# Patient Record
Sex: FEMALE | Race: WHITE | NOT HISPANIC OR LATINO | Employment: OTHER | URBAN - METROPOLITAN AREA
[De-identification: names, ages, dates, MRNs, and addresses within clinical notes are randomized per-mention and may not be internally consistent; named-entity substitution may affect disease eponyms.]

---

## 2023-08-29 ENCOUNTER — TELEPHONE (OUTPATIENT)
Age: 73
End: 2023-08-29

## 2023-08-29 NOTE — TELEPHONE ENCOUNTER
Wanted to make a new patient apt but did not want to go on my chart for the wait list so I advised her to just give us a call back to check if we had any cancellations.

## 2025-02-06 ENCOUNTER — APPOINTMENT (OUTPATIENT)
Dept: RADIOLOGY | Facility: CLINIC | Age: 75
End: 2025-02-06
Payer: MEDICARE

## 2025-02-06 ENCOUNTER — OFFICE VISIT (OUTPATIENT)
Dept: OBGYN CLINIC | Facility: CLINIC | Age: 75
End: 2025-02-06
Payer: MEDICARE

## 2025-02-06 VITALS — BODY MASS INDEX: 23.7 KG/M2 | HEIGHT: 64 IN | WEIGHT: 138.8 LBS

## 2025-02-06 DIAGNOSIS — M25.562 CHRONIC PAIN OF LEFT KNEE: ICD-10-CM

## 2025-02-06 DIAGNOSIS — G89.29 CHRONIC PAIN OF LEFT KNEE: ICD-10-CM

## 2025-02-06 DIAGNOSIS — M25.562 LEFT KNEE PAIN, UNSPECIFIED CHRONICITY: ICD-10-CM

## 2025-02-06 DIAGNOSIS — M71.22 BAKER'S CYST OF KNEE, LEFT: ICD-10-CM

## 2025-02-06 DIAGNOSIS — M17.12 PRIMARY OSTEOARTHRITIS OF LEFT KNEE: Primary | ICD-10-CM

## 2025-02-06 PROCEDURE — 73562 X-RAY EXAM OF KNEE 3: CPT

## 2025-02-06 PROCEDURE — 73560 X-RAY EXAM OF KNEE 1 OR 2: CPT

## 2025-02-06 PROCEDURE — 99204 OFFICE O/P NEW MOD 45 MIN: CPT | Performed by: ORTHOPAEDIC SURGERY

## 2025-02-06 RX ORDER — CLOBETASOL PROPIONATE 0.5 MG/G
1 CREAM TOPICAL 2 TIMES DAILY
COMMUNITY
Start: 2024-11-06

## 2025-02-06 RX ORDER — AMMONIUM LACTATE 12 G/100G
LOTION TOPICAL
COMMUNITY
Start: 2024-11-19

## 2025-02-06 RX ORDER — MELOXICAM 7.5 MG/1
7.5 TABLET ORAL DAILY
Qty: 30 TABLET | Refills: 0 | Status: SHIPPED | OUTPATIENT
Start: 2025-02-06

## 2025-02-06 RX ORDER — LISINOPRIL AND HYDROCHLOROTHIAZIDE 10; 12.5 MG/1; MG/1
TABLET ORAL
COMMUNITY

## 2025-02-06 RX ORDER — OMEPRAZOLE 40 MG/1
40 CAPSULE, DELAYED RELEASE ORAL
COMMUNITY
Start: 2024-11-06

## 2025-02-06 RX ORDER — ATORVASTATIN CALCIUM 10 MG/1
10 TABLET, FILM COATED ORAL DAILY
COMMUNITY

## 2025-02-06 NOTE — PROGRESS NOTES
Assessment/Plan:  1. Primary osteoarthritis of left knee  meloxicam (MOBIC) 7.5 mg tablet    Ambulatory Referral to Physical Therapy      2. Chronic pain of left knee  XR knee 3 vw left non injury    XR knee 1 or 2 vw right    Ambulatory Referral to Physical Therapy      3. Baker's cyst of knee, left          Scribe Attestation      I,:  Torres Elizabethsamueldeirdre am acting as a scribe while in the presence of the attending physician.:       I,:  Wali De La Fuente, DO personally performed the services described in this documentation    as scribed in my presence.:           Dominga is a pleasant 75 y.o. female who presents for initial evaluation of left knee pain. Upon review of available imaging and clinical evaluation, we dicussed her pain and symptoms are consistent with left knee osteoarthritis. I recommended she begin outpatient physical therapy for lower body stretching and strengthening. We discussed beginning a prescription strength anti-inflammatory to address the baker's cyst. The patient was agreeable to the plan and a referral for PT was provided. A script for Meloxicam was provided with instructions. I recommended she utilize ice after aggravating activities and at end of day for pain management. I will see her back in 6 weeks for follow-up evaluation.      Subjective: Initial evaluation of left knee pain    Patient ID: Dominga Bender is a 75 y.o. female who presents in office for initial evaluation of left knee pain. She notes feeling a pop in the posterior knee 2 months ago while wearing heels at a wedding. She continues to experience pain in the medial, anterior, and posterior knee with weight bearing related activity rated a 5/10. Pain is aggravated by standing for extended periods of time, walking, and climbing stairs. She is currently managing pain with ice, heat, and OTC pain medication with mild relief. She denies mechanical symptoms including catching and locking. No further trauma or injury to the left knee. No  distal numbness or tingling.     Review of Systems   Constitutional:  Positive for activity change. Negative for chills and fever.   HENT:  Negative for ear pain and sore throat.    Eyes:  Negative for pain and visual disturbance.   Respiratory:  Negative for cough and shortness of breath.    Cardiovascular:  Negative for chest pain and palpitations.   Gastrointestinal:  Negative for abdominal pain and vomiting.   Genitourinary:  Negative for dysuria and hematuria.   Musculoskeletal:  Positive for arthralgias and myalgias. Negative for back pain.   Skin:  Negative for color change and rash.   Neurological:  Negative for seizures and syncope.   All other systems reviewed and are negative.        Past Medical History:   Diagnosis Date    Cancer (HCC)     Hypertension        Past Surgical History:   Procedure Laterality Date    BREAST LUMPECTOMY         Family History   Family history unknown: Yes       Social History     Occupational History    Not on file   Tobacco Use    Smoking status: Never    Smokeless tobacco: Never   Vaping Use    Vaping status: Never Used   Substance and Sexual Activity    Alcohol use: Yes    Drug use: Never    Sexual activity: Not on file         Current Outpatient Medications:     ammonium lactate (LAC-HYDRIN) 12 % lotion, APPLY TO DRY SKIN ON TRUNK AND ARMS, Disp: , Rfl:     atorvastatin (LIPITOR) 10 mg tablet, Take 10 mg by mouth daily, Disp: , Rfl:     Calcium 200 MG TABS, Take by mouth Three times a day, Disp: , Rfl:     Cholecalciferol 50 MCG (2000 UT) CAPS, Take by mouth, Disp: , Rfl:     clobetasol (TEMOVATE) 0.05 % cream, Apply 1 application. topically 2 (two) times a day, Disp: , Rfl:     lisinopril-hydrochlorothiazide (PRINZIDE,ZESTORETIC) 10-12.5 MG per tablet, TAKE 1 TABLET DAILY (LUPIN), Disp: , Rfl:     meloxicam (MOBIC) 7.5 mg tablet, Take 1 tablet (7.5 mg total) by mouth daily, Disp: 30 tablet, Rfl: 0    omeprazole (PriLOSEC) 40 MG capsule, Take 40 mg by mouth, Disp: , Rfl:      Allergies   Allergen Reactions    Penicillins Hives and Rash       Objective:  There were no vitals filed for this visit.    Body mass index is 24.2 kg/m².    Left Knee Exam     Muscle Strength   The patient has normal left knee strength.    Tenderness   The patient is experiencing tenderness in the patella and medial joint line (and popliteal fossa).    Range of Motion   Extension:  0   Flexion:  120     Tests   Reena:  Medial - negative Lateral - negative  Varus: negative Valgus: negative  Lachman:  Anterior - negative      Drawer:       Posterior - negative  Patellar apprehension: negative    Other   Erythema: absent  Scars: absent  Sensation: normal  Pulse: present  Swelling: none    Comments:  - Apley  Mild + patella grind  Mild swelling in the popliteal fossa  Knee stable at 0, 30, and 90  Peripatellar crepitus noted            Physical Exam  Nursing note reviewed.   Constitutional:       Appearance: Normal appearance.   HENT:      Head: Normocephalic.      Right Ear: External ear normal.      Left Ear: External ear normal.      Nose: Nose normal.   Eyes:      Extraocular Movements: Extraocular movements intact.      Conjunctiva/sclera: Conjunctivae normal.   Cardiovascular:      Rate and Rhythm: Normal rate.      Pulses: Normal pulses.   Pulmonary:      Breath sounds: Normal breath sounds.   Musculoskeletal:      Cervical back: Normal range of motion.      Left knee:      Instability Tests: Medial Reena test negative and lateral Reena test negative.      Comments: As noted   Skin:     General: Skin is warm and dry.   Neurological:      Mental Status: She is alert and oriented to person, place, and time.   Psychiatric:         Mood and Affect: Mood normal.         Behavior: Behavior normal.         I have personally reviewed pertinent films in PACS.  X-ray imaging obtained on 2/6/2025 demonstrate left knee moderate degenerative changes with osteophyte formation. No acute fracture or dislocation.  No lytic or blastic osseous lesions.     This document was created using speech voice recognition software.   Grammatical errors, random word insertions, pronoun errors, and incomplete sentences are an occasional consequence of this system due to software limitations, ambient noise, and hardware issues.   Any formal questions or concerns about content, text, or information contained within the body of this dictation should be directly addressed to the provider for clarification.

## 2025-02-26 ENCOUNTER — EVALUATION (OUTPATIENT)
Dept: PHYSICAL THERAPY | Facility: CLINIC | Age: 75
End: 2025-02-26
Payer: MEDICARE

## 2025-02-26 DIAGNOSIS — G89.29 CHRONIC PAIN OF LEFT KNEE: Primary | ICD-10-CM

## 2025-02-26 DIAGNOSIS — M54.50 RIGHT-SIDED LOW BACK PAIN WITHOUT SCIATICA, UNSPECIFIED CHRONICITY: ICD-10-CM

## 2025-02-26 DIAGNOSIS — M17.12 PRIMARY OSTEOARTHRITIS OF LEFT KNEE: ICD-10-CM

## 2025-02-26 DIAGNOSIS — M25.562 CHRONIC PAIN OF LEFT KNEE: Primary | ICD-10-CM

## 2025-02-26 PROCEDURE — 97110 THERAPEUTIC EXERCISES: CPT

## 2025-02-26 PROCEDURE — 97162 PT EVAL MOD COMPLEX 30 MIN: CPT

## 2025-02-26 NOTE — PROGRESS NOTES
PT Evaluation     Today's date: 2025  Patient name: Dominga Bender  : 1950  MRN: [id number]  Referring provider: Wali De La Fuente DO  Dx:   Encounter Diagnosis     ICD-10-CM    1. Chronic pain of left knee  M25.562 Ambulatory Referral to Physical Therapy    G89.29       2. Primary osteoarthritis of left knee  M17.12 Ambulatory Referral to Physical Therapy      3. Right-sided low back pain without sciatica, unspecified chronicity  M54.50           Start Time: 1430  Stop Time: 1525  Total time in clinic (min): 55 minutes    Assessment  Impairments: abnormal gait, abnormal or restricted ROM, activity intolerance, impaired physical strength, lacks appropriate home exercise program, pain with function and weight-bearing intolerance    Assessment details: Dominga Bender is a 75 y.o. female who presents with L knee and R sided low back pain, decreased B/L LE strength, decreased L knee ROM, ambulatory dysfunction with prolonged distances, and balance dysfunction (secondary to pain). Due to these impairments, patient has difficulty performing ADL's, recreational activities, engaging in social activities, ambulation of prolonged distances, stair negotiation at PLOF (performs with more caution and at slower pace), lifting/carrying. Mechanical assessment revealed signs of lumbar derangement with lumbar extension directional preference. Patient given CRESENCIO with HEP to further assess. Patient demo understanding of edu on centralization and peripheralization and when to stop vs continue based on results. Patient's clinical presentation is consistent with their referring diagnosis of Left knee pain, L knee osteoarthritis, and Right sided low back pain without sciatica. Patient has been educated in mechanical assessment, disc bulge vs stenosis and difference in treatment, peripheralization vs centralization, knee inflammation and effects on ROM, home exercise program and plan of care. Patient would benefit from skilled  physical therapy services to address their aforementioned functional limitations and progress towards prior level of function and independence with home exercise program.       Goals  Short Term Goals:  to be accomplished in 4 weeks   STG1. Initiate and advance HEP to maximize progress between therapy sessions  STG2. Pt will demo L/S AROM < or = mod loss throughout to promote improved functional mobility and body mechanics  STG3. Improve B/L LE strength to 4/5 to allow pt to raise from sit to stand w/o UE assist.  STG4. Reduce pain w/ WB activity to 0-4/10.  STG5. Pt will deny sleep disturbance secondary to pain     Long Term Goals:  Target Date 12 weeks   LTG1. Pt to be Indep with HEP  to maximize progress between therapy sessions and improve functional mobility  LTG2. Pt will demo L knee ROM of 0-130 needed for reciprocal stairs w/ handrail   LTG3.Pt to demo B/L LE strength of 4+/5 or better such that pt can perform reciprocal stair negotiation  LTG4. Pt will return to household ADLs and work duties pain free as per PLOF  LTG5. Pt will demo Good strength core stabilizers to promote carryover with body mech and posture  LTG6. Pt will demo L/S AROM < or = min loss throughout to promote improved functional mobility and body mechanics          Plan  Patient would benefit from: PT eval and skilled physical therapy  Planned modality interventions: cryotherapy, thermotherapy: hydrocollator packs and unattended electrical stimulation    Planned therapy interventions: manual therapy, neuromuscular re-education, self care, therapeutic activities, therapeutic exercise, home exercise program, patient education, balance/weight bearing training, IASTM, joint mobilization, Freitas taping, kinesiology taping, postural training, strengthening, stretching, flexibility and abdominal trunk stabilization    Frequency: 2x week  Plan of Care beginning date: 2/26/2025  Plan of Care expiration date: 5/21/2025  Treatment plan discussed  with: patient  Plan details: HEP development, stretching, strengthening, A/AA/PROM, joint mobilizations, posture education, STM/MI as needed to reduce muscle tension, muscle reeducation, PLOC discussed and agreed upon with patient.            Subjective Evaluation    History of Present Illness  Mechanism of injury: Patient reports to PT with concerns with L knee and R sided low back pain. Patient reports she has had PT in the past and chiropractor in the past for LBP. Patients knee pain began about 3 months ago after walking in heels for a while at a wedding. She had tried rest, ice, heat, and advil which helped a little. Patient then went to [place] to visit family where she noted she could not hike as well as previously. The knee pain is not constant but does flare up at night, stair negotiation (better than it was before), prolonged walking. Patient reports standing, ADLS like vacuuming, and walking tends to bother back pain.   Patient Goals  Patient goals for therapy: decreased pain, increased motion, increased strength, improved balance, independence with ADLs/IADLs and return to sport/leisure activities    Pain  Pain scale: L knee: 5/10  R LBP: 6/10.  Pain scale at highest: L knee: 7/10 and R LBP 9/10.  Quality: sharp and dull ache  Relieving factors: heat, medications and ice  Aggravating factors: stair climbing, walking and standing  Progression: no change    Social Support  Steps to enter house: yes  Stairs in house: yes   Lives in: multiple-level home  Lives with: spouse      Diagnostic Tests  Abnormal x-ray: L knee: Moderate medial compartment degenerative changes as above with mild varus angulation. No fracture. Small effusion.  Abnormal MRI: multiple disc bulges and stenosis.  Treatments  Previous treatment: physical therapy and chiropractic        Objective    (*=pain)    Lumbar ROM limitations   Flexion: min-mod bedoya*   Extension: mod bedoya*   RSG: min bedoya   LSG: min bedoya     Mechanical Assessment   5/10  R sided low back  CRESENCIO: 1x10 = dec/b        Knee AROM: deg      R  L  Extension: (sup)   0  -2  Flexion: (sup/prone)  130/110 125/100      Strength: MMT  Hip    R  L    IR(seated)   4/5  4-/5  ER(seated)   4/5  4-/5   Flexion(sup)   4/5  4/5  Abduction(S/L)  4/5  4/5  Extension (prone)  4-/5  4-/5    Knee    R  L    Extension(Seated)  4/5  4/5  Flexion(Seated)  4-/5  4-/5        Flexibility:  HS WNL  Min bedoya in B/L gastroc   Mod limin B/L quads/hip flexors       Patellar joint mobility:   mild hypomobility in L patella vs R     Tenderness/Palpation:  No TTP noted     Special Tests: L knee   Valgus Stress: neg  Varus Stress: neg   McMurrary's IR of the tibia + Varus stress = lateral meniscus: neg   McMurrays ER of the tibia + Valgus stress = medial meniscus: neg   Lachman: neg   Post drawer: neg     Balance:   Tandem R posterior: TBA  Tandem L posterior:TBA  SLS R:TBA  SLS L:TBA    Function:   Stairs:TBA  Squatting:TBA  Ambulation: mild antalgic gait pattern w/ limited stance time on LLE              Precautions:   Past Medical History:   Diagnosis Date    Cancer (HCC)     Hypertension      Past Surgical History:   Procedure Laterality Date    BREAST LUMPECTOMY       SOC: 2/26/2025  FOTO: 2/26/2025  POC Expiration: 5/21/2025  Daily Treatment Log:  Date 2/26/2025       Visit # 1       Auth         Auth exp        Manual                        There Exer        LAQ         Heel slides         Std hip abd         Std hip ext         Supine Leg press                                                 Pt edu  On mechanical assessment, lumbar derangement, peripheralization vs centralization, knee inflammation and effects on ROM,  and all questions answered        HEP Created and discussed       There Activ        STS         FSU         LSU                                 NMReed        Monster walk         Side stepping         HR/TR         SLR flex w/ QS         Bridges         Repeated knee ext sitting         TA brace  supine         CRESENCIO                                                 Modalities                                HEP:   Access Code: [email or web address]  URL: [email or web address]  Date: 02/26/2025  Prepared by: Siena Zabala    Exercises  - Standing Lumbar Extension with Counter  - 5 x daily - 7 x weekly - 1 sets - 10 reps  - Supine Heel Slide with Strap  - 1 x daily - 7 x weekly - 1 sets - 10 reps  - Seated Long Arc Quad  - 1 x daily - 7 x weekly - 1 sets - 10 reps  - Sidelying Hip Abduction  - 1 x daily - 7 x weekly - 1 sets - 10 reps

## 2025-03-03 ENCOUNTER — OFFICE VISIT (OUTPATIENT)
Dept: PHYSICAL THERAPY | Facility: CLINIC | Age: 75
End: 2025-03-03
Payer: MEDICARE

## 2025-03-03 DIAGNOSIS — M25.562 CHRONIC PAIN OF LEFT KNEE: Primary | ICD-10-CM

## 2025-03-03 DIAGNOSIS — M17.12 PRIMARY OSTEOARTHRITIS OF LEFT KNEE: ICD-10-CM

## 2025-03-03 DIAGNOSIS — M54.50 RIGHT-SIDED LOW BACK PAIN WITHOUT SCIATICA, UNSPECIFIED CHRONICITY: ICD-10-CM

## 2025-03-03 DIAGNOSIS — G89.29 CHRONIC PAIN OF LEFT KNEE: Primary | ICD-10-CM

## 2025-03-03 PROCEDURE — 97112 NEUROMUSCULAR REEDUCATION: CPT

## 2025-03-03 PROCEDURE — 97110 THERAPEUTIC EXERCISES: CPT

## 2025-03-03 NOTE — PROGRESS NOTES
"Daily Note     Today's date: 3/3/2025  Patient name: Dominga Bender  : 1950  MRN: [id number]  Referring provider: Wali De La Fuente DO  Dx:   Encounter Diagnosis     ICD-10-CM    1. Chronic pain of left knee  M25.562     G89.29       2. Primary osteoarthritis of left knee  M17.12       3. Right-sided low back pain without sciatica, unspecified chronicity  M54.50                      Subjective: Patient noted improvement with LBP with CRESENCIO. Patient has 3/10 in knee and back pain.       Objective: See treatment diary below      Assessment: Tolerated treatment well with some discomfort noted with bridges. No pain noted with other TE. Some difficulty with B/L leg press however able to perform. Patient would benefit from continued PT      Plan: Continue per plan of care.      Precautions:   Past Medical History:   Diagnosis Date    Cancer (HCC)     Hypertension      Past Surgical History:   Procedure Laterality Date    BREAST LUMPECTOMY       SOC: 2025  FOTO: 2025  POC Expiration: 2025  Daily Treatment Log:  Date 2025 3/3/2025      Visit # 1 2      Auth         Auth exp        Manual                        There Exer  15'      LAQ   3\" x10       Heel slides         Std hip abd   1x10 R/L alt       Std hip ext   1x10 R/L alt       Supine Leg press   B/L 10# 1x10       Figure 4 stretch   15\" x3 R/L       Supine hp flexor stretch   15\" x3 R/L                               Pt edu  On mechanical assessment, lumbar derangement, peripheralization vs centralization, knee inflammation and effects on ROM,  and all questions answered        HEP Created and discussed       There Activ        STS         FSU         LSU                                 NMReed  23'      Monster walk         Side stepping         HR/TR         SLR flex w/ QS   1x10 R/L       Bridges   1x10 (ache in low back )       Repeated knee ext sitting   1x10 = NE       TA brace supine   3\" x10 TC for engagement       CRESENCIO         Palloff " press   Dbl red tubing 1x10 ea way                                       Modalities                                HEP:   Access Code: [email or web address]  URL: [email or web address]  Date: 02/26/2025  Prepared by: Siena Zabala    Exercises  - Standing Lumbar Extension with Counter  - 5 x daily - 7 x weekly - 1 sets - 10 reps  - Supine Heel Slide with Strap  - 1 x daily - 7 x weekly - 1 sets - 10 reps  - Seated Long Arc Quad  - 1 x daily - 7 x weekly - 1 sets - 10 reps  - Sidelying Hip Abduction  - 1 x daily - 7 x weekly - 1 sets - 10 reps

## 2025-03-06 ENCOUNTER — OFFICE VISIT (OUTPATIENT)
Dept: PHYSICAL THERAPY | Facility: CLINIC | Age: 75
End: 2025-03-06
Payer: MEDICARE

## 2025-03-06 DIAGNOSIS — M17.12 PRIMARY OSTEOARTHRITIS OF LEFT KNEE: ICD-10-CM

## 2025-03-06 DIAGNOSIS — G89.29 CHRONIC PAIN OF LEFT KNEE: Primary | ICD-10-CM

## 2025-03-06 DIAGNOSIS — M54.50 RIGHT-SIDED LOW BACK PAIN WITHOUT SCIATICA, UNSPECIFIED CHRONICITY: ICD-10-CM

## 2025-03-06 DIAGNOSIS — M25.562 CHRONIC PAIN OF LEFT KNEE: Primary | ICD-10-CM

## 2025-03-06 PROCEDURE — 97110 THERAPEUTIC EXERCISES: CPT

## 2025-03-06 PROCEDURE — 97112 NEUROMUSCULAR REEDUCATION: CPT

## 2025-03-06 NOTE — PROGRESS NOTES
"Daily Note     Today's date: 3/6/2025  Patient name: Dominga Bender  : 1950  MRN: [id number]  Referring provider: Wali De La Fuente DO  Dx:   Encounter Diagnosis     ICD-10-CM    1. Chronic pain of left knee  M25.562     G89.29       2. Primary osteoarthritis of left knee  M17.12       3. Right-sided low back pain without sciatica, unspecified chronicity  M54.50                      Subjective: Patient reports about 4/10 R sided Low back. May have been due to vacuuming earlier today.  Patient has about 3/10 pain in knee. Patient had some mild soreness after last session but did calm down.       Objective: See treatment diary below  4/10 R sided low back pain   CRESENCIO: 2x10 = NE   Qped hip drop: 1x10 = dec/     Assessment: Tolerated treatment well with decrease in low back symptoms  with qped hip drops. Added to HEP with edu on centralization vs peripheralization. Noted discomfort in low back with supine hip flexor stretch, adjusted to standing with improved tolerance.  Patient would benefit from continued PT      Plan: Continue per plan of care.      Precautions:   Past Medical History:   Diagnosis Date    Cancer (HCC)     Hypertension      Past Surgical History:   Procedure Laterality Date    BREAST LUMPECTOMY       SOC: 2025  FOTO: 2025  POC Expiration: 2025  Daily Treatment Log:  Date 2025 3/3/2025 3/6/2025     Visit # 1 2 3     Auth         Auth exp        Manual                        There Exer  15' 25'     LAQ   3\" x10  3\" x10      Heel slides         Std hip abd   1x10 R/L alt  1x10 R/L alt      Std hip ext   1x10 R/L alt  1x10 R/L alt      Supine Leg press   B/L 10# 1x10       Figure 4 stretch   15\" x3 R/L  15\" x3 R/L      Supine hip flexor stretch   15\" x3 R/L  Std at counter 15\" x3 R/L alt                              Pt edu  On mechanical assessment, lumbar derangement, peripheralization vs centralization, knee inflammation and effects on ROM,  and all questions answered      " "  HEP Created and discussed  Updated and discussed      There Activ        STS         FSU         LSU                                 NMReed  23' 15'     Monster walk         Side stepping         HR/TR         SLR flex w/ QS   1x10 R/L       Bridges   1x10 (ache in low back )       Repeated knee ext sitting   1x10 = NE       TA brace supine   3\" x10 TC for engagement       CRESENCIO    1x10; x2      Palloff press   Dbl red tubing 1x10 ea way       Qped hip drop   1x10 w/ exhale                      Mechanical assessment    EG     Modalities                                HEP:   Access Code: [email or web address]  URL: [email or web address]  Date: 03/06/2025  Prepared by: Siena Zabala    Exercises  - Quadruped Hip Drops  - 2 x daily - 7 x weekly - 1 sets - 10 reps  - Standing Lumbar Extension with Counter  - 3 x daily - 7 x weekly - 1 sets - 10 reps  - Seated Long Arc Quad  - 1 x daily - 7 x weekly - 1 sets - 10 reps  - Standing Hip Abduction with Counter Support  - 1 x daily - 7 x weekly - 3 sets - 10 reps  - Standing Hip Extension with Counter Support  - 1 x daily - 7 x weekly - 3 sets - 10 reps  - Standing Hip Flexor Stretch  - 1 x daily - 7 x weekly - 1 sets - 5 reps - 10 sec hold         "

## 2025-03-10 ENCOUNTER — OFFICE VISIT (OUTPATIENT)
Dept: PHYSICAL THERAPY | Facility: CLINIC | Age: 75
End: 2025-03-10
Payer: MEDICARE

## 2025-03-10 DIAGNOSIS — M17.12 PRIMARY OSTEOARTHRITIS OF LEFT KNEE: ICD-10-CM

## 2025-03-10 DIAGNOSIS — M54.50 RIGHT-SIDED LOW BACK PAIN WITHOUT SCIATICA, UNSPECIFIED CHRONICITY: ICD-10-CM

## 2025-03-10 DIAGNOSIS — M25.562 CHRONIC PAIN OF LEFT KNEE: Primary | ICD-10-CM

## 2025-03-10 DIAGNOSIS — G89.29 CHRONIC PAIN OF LEFT KNEE: Primary | ICD-10-CM

## 2025-03-10 PROCEDURE — 97110 THERAPEUTIC EXERCISES: CPT

## 2025-03-10 PROCEDURE — 97112 NEUROMUSCULAR REEDUCATION: CPT

## 2025-03-10 NOTE — PROGRESS NOTES
"Daily Note     Today's date: 3/10/2025  Patient name: Dominga Bender  : 1950  MRN: [id number]  Referring provider: Wali De La Fuente DO  Dx:   Encounter Diagnosis     ICD-10-CM    1. Chronic pain of left knee  M25.562     G89.29       2. Primary osteoarthritis of left knee  M17.12       3. Right-sided low back pain without sciatica, unspecified chronicity  M54.50                      Subjective: Patient reports she was a bit sore after last session. Patient felt the the bed was too soft for correct form with qped hip drops and on the floor bothered her knees.       Objective: See treatment diary below      Assessment: Tolerated treatment well with no increased pain with trial of prone press up due to difficulty with qped hip drops at home secondary to knee pain.  Patient to report back on any soreness or discomfort post session at next visit. Patient would benefit from continued PT      Plan: Continue per plan of care.      Precautions:   Past Medical History:   Diagnosis Date    Cancer (HCC)     Hypertension      Past Surgical History:   Procedure Laterality Date    BREAST LUMPECTOMY       SOC: 2025  FOTO: 2025  POC Expiration: 2025  Daily Treatment Log:  Date 2025 3/3/2025 3/6/2025 3/10/2025    Visit # 1 2 3 4    Auth         Auth exp        Manual                        There Exer  15' 25' 23'    LAQ   3\" x10  3\" x10  W/ add 3\" x10    Heel slides         Std hip abd   1x10 R/L alt  1x10 R/L alt  1x10 R/L  Add YTB    Std hip ext   1x10 R/L alt  1x10 R/L alt  1x10 R/L      Supine Leg press   B/L 10# 1x10   B/L 10# 1x10     Figure 4 stretch   15\" x3 R/L  15\" x3 R/L  15\" x3 R/L     Supine hip flexor stretch   15\" x3 R/L  Std at counter 15\" x3 R/L alt  Std at counter 15\" x3 R/L alt                             Pt edu  On mechanical assessment, lumbar derangement, peripheralization vs centralization, knee inflammation and effects on ROM,  and all questions answered        HEP Created and " "discussed  Updated and discussed  Updated and discussed prone press up vs qped hip drop for knee pain    There Activ        STS         FSU         LSU                                 NMReed  23' 15' 15'    Monster walk         Side stepping         HR/TR     1x10 ea     SLR flex w/ QS   1x10 R/L   1x10 R/L     Bridges   1x10 (ache in low back )       Repeated knee ext sitting   1x10 = NE       TA brace supine   3\" x10 TC for engagement       CRESENCIO    1x10; x2      Palloff press   Dbl red tubing 1x10 ea way   Dbl red tubing 1x10 ea way     Qped hip drop   1x10 w/ exhale  Prone press up w/ exhale 1x10     Sciatic N glide     1x10 R/L             Mechanical assessment    EG     Modalities                                HEP:   Access Code: [email or web address]  URL: [email or web address]  Date: 03/10/2025  Prepared by: Siena Zabala    Program Notes  if you do extras of the prone press ups you can do less of the standing extension. The goal is a total of 5x a day minimum. Can do more if needed     Exercises  - Prone Press Up  - 2 x daily - 7 x weekly - 1 sets - 10 reps  - Standing Lumbar Extension with Counter  - 3 x daily - 7 x weekly - 1 sets - 10 reps  - Seated Long Arc Quad  - 1 x daily - 7 x weekly - 1 sets - 10 reps  - Standing Hip Abduction with Counter Support  - 1 x daily - 7 x weekly - 3 sets - 10 reps  - Standing Hip Extension with Counter Support  - 1 x daily - 7 x weekly - 3 sets - 10 reps  - Standing Hip Flexor Stretch  - 1 x daily - 7 x weekly - 1 sets - 5 reps - 10 sec hold         "

## 2025-03-11 DIAGNOSIS — M17.12 PRIMARY OSTEOARTHRITIS OF LEFT KNEE: ICD-10-CM

## 2025-03-11 NOTE — TELEPHONE ENCOUNTER
Reason for call:   [x] Refill   [] Prior Auth  [] Other:     Office:   [] PCP/Provider -   [x] Specialty/Provider - Ortho     Medication: Meloxicam     Dose/Frequency: 7.5 mg tablet taken by mouth once daily     Quantity: 30    Pharmacy: [id number] - [place] - [address] [facility] [phone]     Local Pharmacy   Does the patient have enough for 3 days?   [] Yes   [x] No - Send as HP to POD

## 2025-03-12 ENCOUNTER — OFFICE VISIT (OUTPATIENT)
Dept: PHYSICAL THERAPY | Facility: CLINIC | Age: 75
End: 2025-03-12
Payer: MEDICARE

## 2025-03-12 DIAGNOSIS — M25.562 CHRONIC PAIN OF LEFT KNEE: Primary | ICD-10-CM

## 2025-03-12 DIAGNOSIS — M54.50 RIGHT-SIDED LOW BACK PAIN WITHOUT SCIATICA, UNSPECIFIED CHRONICITY: ICD-10-CM

## 2025-03-12 DIAGNOSIS — G89.29 CHRONIC PAIN OF LEFT KNEE: Primary | ICD-10-CM

## 2025-03-12 DIAGNOSIS — M17.12 PRIMARY OSTEOARTHRITIS OF LEFT KNEE: ICD-10-CM

## 2025-03-12 PROCEDURE — 97110 THERAPEUTIC EXERCISES: CPT

## 2025-03-12 PROCEDURE — 97112 NEUROMUSCULAR REEDUCATION: CPT

## 2025-03-12 RX ORDER — MELOXICAM 7.5 MG/1
7.5 TABLET ORAL DAILY PRN
Qty: 30 TABLET | Refills: 1 | Status: SHIPPED | OUTPATIENT
Start: 2025-03-12

## 2025-03-12 NOTE — PROGRESS NOTES
"Daily Note     Today's date: 3/12/2025  Patient name: Dominga Bender  : 1950  MRN: [id number]  Referring provider: aWli De La Fuente DO  Dx:   Encounter Diagnosis     ICD-10-CM    1. Chronic pain of left knee  M25.562     G89.29       2. Primary osteoarthritis of left knee  M17.12       3. Right-sided low back pain without sciatica, unspecified chronicity  M54.50                        Subjective: Patient reports the prone press up still bothers her knee a bit, will trial with mat on floor to see if this improves tolerance. Patient finds the CRESENCIO feels better for the back and is not irritating the knee. Patients current pain level is 4/10 in the back. Patient feels the knee feels a bit better currently but it does still wake her up at night.       Objective: See treatment diary below      Assessment: Tolerated treatment well with no increase in pain reported t/o session with increased resistance with std hip abd and ext. Patient to report back on any soreness or discomfort post session at next visit. Will hold on press ups with HEP if continues to bother knee and increase frequency of CRESENCIO. Patient verbalized understanding. Patient would benefit from continued PT      Plan: Continue per plan of care.      Precautions:   Past Medical History:   Diagnosis Date    Cancer (HCC)     Hypertension      Past Surgical History:   Procedure Laterality Date    BREAST LUMPECTOMY       SOC: 2025  FOTO: 2025  POC Expiration: 2025  Daily Treatment Log:  Date 2025 3/3/2025 3/6/2025 3/10/2025 3/12/2025   Visit # 1 2 3 4 5 (FOTO)    Auth         Auth exp        Manual                        There Exer  15' 25' 23' 20'   LAQ   3\" x10  3\" x10  W/ add 3\" x10 W/ add 3\" x10 R/L    Heel slides         Std hip abd   1x10 R/L alt  1x10 R/L alt  1x10 R/L  YTB 1x10 R/L    Std hip ext   1x10 R/L alt  1x10 R/L alt  1x10 R/L   YTB 1x10 R/L    Supine Leg press   B/L 10# 1x10   B/L 10# 1x10  Seated B/L 10# 1x10 (less " "discomfort in shoulders)    Figure 4 stretch   15\" x3 R/L  15\" x3 R/L  15\" x3 R/L  15\" x3 R/L    Supine hip flexor stretch   15\" x3 R/L  Std at counter 15\" x3 R/L alt  Std at counter 15\" x3 R/L alt  Std at counter 15\" x3 R/L alt                            Pt edu  On mechanical assessment, lumbar derangement, peripheralization vs centralization, knee inflammation and effects on ROM,  and all questions answered     Instructed to do more of the CRESENCIO than prone press up if she feels it gives her a better response. May need to increase frequency or reps as needed for pain level.    HEP Created and discussed  Updated and discussed  Updated and discussed prone press up vs qped hip drop for knee pain    There Activ        STS         FSU         LSU                                 NMReed  23' 15' 15' 15'   Monster walk         Side stepping         HR/TR     1x10 ea  1x10 ea   SLR flex w/ QS   1x10 R/L   1x10 R/L  1x10 R/L    Bridges   1x10 (ache in low back )       Repeated knee ext sitting   1x10 = NE       TA brace supine   3\" x10 TC for engagement       CRESENCIO    1x10; x2   1x10    Palloff press   Dbl red tubing 1x10 ea way   Dbl red tubing 1x10 ea way  Sanjiv 5# 1x10 ea way    Qped hip drop   1x10 w/ exhale  Prone press up w/ exhale 1x10     Sciatic N glide     1x10 R/L  1x10 R/L           Mechanical assessment    EG     Modalities                                HEP:   Access Code: [email or web address]  URL: [email or web address]  Date: 03/10/2025  Prepared by: Siena Zabala    Program Notes  if you do extras of the prone press ups you can do less of the standing extension. The goal is a total of 5x a day minimum. Can do more if needed     Exercises  - Prone Press Up  - 2 x daily - 7 x weekly - 1 sets - 10 reps  - Standing Lumbar Extension with Counter  - 3 x daily - 7 x weekly - 1 sets - 10 reps  - Seated Long Arc Quad  - 1 x daily - 7 x weekly - 1 sets - 10 reps  - Standing Hip Abduction with Counter Support  - 1 x " daily - 7 x weekly - 3 sets - 10 reps  - Standing Hip Extension with Counter Support  - 1 x daily - 7 x weekly - 3 sets - 10 reps  - Standing Hip Flexor Stretch  - 1 x daily - 7 x weekly - 1 sets - 5 reps - 10 sec hold

## 2025-03-17 ENCOUNTER — APPOINTMENT (OUTPATIENT)
Dept: PHYSICAL THERAPY | Facility: CLINIC | Age: 75
End: 2025-03-17
Payer: MEDICARE

## 2025-03-19 ENCOUNTER — OFFICE VISIT (OUTPATIENT)
Dept: PHYSICAL THERAPY | Facility: CLINIC | Age: 75
End: 2025-03-19
Payer: MEDICARE

## 2025-03-19 DIAGNOSIS — G89.29 CHRONIC PAIN OF LEFT KNEE: Primary | ICD-10-CM

## 2025-03-19 DIAGNOSIS — M54.50 RIGHT-SIDED LOW BACK PAIN WITHOUT SCIATICA, UNSPECIFIED CHRONICITY: ICD-10-CM

## 2025-03-19 DIAGNOSIS — M17.12 PRIMARY OSTEOARTHRITIS OF LEFT KNEE: ICD-10-CM

## 2025-03-19 DIAGNOSIS — M25.562 CHRONIC PAIN OF LEFT KNEE: Primary | ICD-10-CM

## 2025-03-19 PROCEDURE — 97110 THERAPEUTIC EXERCISES: CPT

## 2025-03-19 PROCEDURE — 97112 NEUROMUSCULAR REEDUCATION: CPT

## 2025-03-19 NOTE — PROGRESS NOTES
"Daily Note     Today's date: 3/19/2025  Patient name: Doimnga Bender  : 1950  MRN: [id number]  Referring provider: Wali De La Fuente DO  Dx:   Encounter Diagnosis     ICD-10-CM    1. Chronic pain of left knee  M25.562     G89.29       2. Primary osteoarthritis of left knee  M17.12       3. Right-sided low back pain without sciatica, unspecified chronicity  M54.50                        Subjective: Patient reports she has about 4/10 pain. After last session she had about 1 day of T/S pain but that has gone away: thinks it may have been the leg press or palloff press as these were the only new exercises last session. CRESENCIO have been helping the low back pain.       Objective: See treatment diary below      Assessment: Tolerated treatment well. Held leg press and palloff press to assess response. HEP updated with patient verbalizing understanding. Patient has follow up with MD tomorrow. Patient would benefit from continued PT      Plan: Continue per plan of care.      Precautions:   Past Medical History:   Diagnosis Date    Cancer (HCC)     Hypertension      Past Surgical History:   Procedure Laterality Date    BREAST LUMPECTOMY       SOC: 2025  FOTO: 2025  POC Expiration: 2025  Daily Treatment Log:  Date 3/19/2025  3/6/2025 3/10/2025 3/12/2025   Visit # 6  3 4 5 (FOTO)    Auth         Auth exp        Manual                        There Exer 15'  25' 23' 20'   LAQ  W/ add 3\" x10 R/L alt   3\" x10  W/ add 3\" x10 W/ add 3\" x10 R/L    Std hip abd  YTB 1x10 R/L alt   1x10 R/L alt  1x10 R/L  YTB 1x10 R/L    Std hip ext  YTB 1x10 R/L alt   1x10 R/L alt  1x10 R/L   YTB 1x10 R/L    Supine Leg press  Hold    B/L 10# 1x10  Seated B/L 10# 1x10 (less discomfort in shoulders)    Figure 4 stretch  15\" x3 R/L   15\" x3 R/L  15\" x3 R/L  15\" x3 R/L    Std hip flexor stretch  Std at counter 15\" x3 R/L alt   Std at counter 15\" x3 R/L alt  Std at counter 15\" x3 R/L alt  Std at counter 15\" x3 R/L alt    Hooklying quanghell " " YTB 1x10                        Pt edu      Instructed to do more of the CRESENCIO than prone press up if she feels it gives her a better response. May need to increase frequency or reps as needed for pain level.    HEP Updated and discussed   Updated and discussed  Updated and discussed prone press up vs qped hip drop for knee pain    There Activ 5'       STS         FSU  Step tap 8\" step 1x10 R/L alt        LSU  4\" step 1x10 R/L                                NMReed 15'  15' 15' 15'   Monster walk         Side stepping         HR/TR  1x10 ea    1x10 ea  1x10 ea   SLR flex w/ QS  1x10 R/L    1x10 R/L  1x10 R/L    Bridges         TA brace supine  3\" x10        CRESENCIO  1x10   1x10; x2   1x10    Palloff press  Hold    Dbl red tubing 1x10 ea way  Engelhard 5# 1x10 ea way    Qped hip drop   1x10 w/ exhale  Prone press up w/ exhale 1x10     Sciatic N glide  1x10 R/L    1x10 R/L  1x10 R/L           Mechanical assessment    EG     Modalities                                HEP:   Access Code: [email or web address]  URL: [email or web address]  Date: 03/10/2025  Prepared by: Siena Zabala    Program Notes  if you do extras of the prone press ups you can do less of the standing extension. The goal is a total of 5x a day minimum. Can do more if needed     Exercises  - Prone Press Up  - 2 x daily - 7 x weekly - 1 sets - 10 reps  - Standing Lumbar Extension with Counter  - 3 x daily - 7 x weekly - 1 sets - 10 reps  - Seated Long Arc Quad  - 1 x daily - 7 x weekly - 1 sets - 10 reps  - Standing Hip Abduction with Counter Support  - 1 x daily - 7 x weekly - 3 sets - 10 reps  - Standing Hip Extension with Counter Support  - 1 x daily - 7 x weekly - 3 sets - 10 reps  - Standing Hip Flexor Stretch  - 1 x daily - 7 x weekly - 1 sets - 5 reps - 10 sec hold         "

## 2025-03-20 ENCOUNTER — OFFICE VISIT (OUTPATIENT)
Dept: OBGYN CLINIC | Facility: CLINIC | Age: 75
End: 2025-03-20
Payer: MEDICARE

## 2025-03-20 VITALS — WEIGHT: 141 LBS | HEIGHT: 64 IN | BODY MASS INDEX: 24.07 KG/M2

## 2025-03-20 DIAGNOSIS — M25.562 CHRONIC PAIN OF LEFT KNEE: ICD-10-CM

## 2025-03-20 DIAGNOSIS — G89.29 CHRONIC PAIN OF LEFT KNEE: ICD-10-CM

## 2025-03-20 DIAGNOSIS — M17.12 PRIMARY OSTEOARTHRITIS OF LEFT KNEE: Primary | ICD-10-CM

## 2025-03-20 PROCEDURE — 99213 OFFICE O/P EST LOW 20 MIN: CPT | Performed by: ORTHOPAEDIC SURGERY

## 2025-03-20 NOTE — ASSESSMENT & PLAN NOTE
Continue PT, transition to HEP when appropriate  Continue with Meloxicam as needed  Activities as tolerated

## 2025-03-20 NOTE — PROGRESS NOTES
"Name: Dominga Bender      : 1950      MRN: [id number]  Encounter Provider: Wali De La Fuente DO  Encounter Date: 3/20/2025   Encounter department: [facility]  :  Assessment & Plan  Primary osteoarthritis of left knee  Continue PT, transition to HEP when appropriate  Continue with Meloxicam as needed  Activities as tolerated       Chronic pain of left knee              Dominga Bender is a pleasant 75 y.o. female who presents for follow-up evaluation of left knee pain secondary to osteoarthritis.  Patient notes significant symptomatic improvement with physical therapy and meloxicam, I am pleased with her progress recommend continued conservative treatment at this time.  She can continue her efforts at physical therapy with transition to home exercise program when appropriate. Discussed reducing Meloxicam prescription to as needed, she was amenable to this. At this time she can follow up if symptoms worsen or fail to improve.       I have personally reviewed pertinent imaging in PACS.      History: Dominga Bender is a 75 y.o. female presents for follow-up evaluation of left knee pain secondary to osteoarthritis.  Patient was last seen 2025, she was referred to physical therapy and provided a prescription for meloxicam.  She is very pleased with her progress currently, her pain has reduced to a 2 out of 10 intermittently. When she has pain it is still localized to the medial joint line. She ambulates with a cane. Denies acute injury or trauma.       Estimated body mass index is 24.59 kg/m² as calculated from the following:    Height as of this encounter: 5' 3.5\" (1.613 m).    Weight as of this encounter: 64 kg (141 lb).    No results found for: \"HGBA1C\"    Social History     Occupational History    Not on file   Tobacco Use    Smoking status: Never    Smokeless tobacco: Never   Vaping Use    Vaping status: Never Used   Substance and Sexual Activity    Alcohol use: Yes    Drug " use: Never    Sexual activity: Not on file       Objective:  Left Knee Exam     Tenderness   The patient is experiencing no tenderness.     Range of Motion   Extension:  0   Left knee flexion: 125.     Tests   Varus: negative Valgus: negative    Other   Erythema: absent  Sensation: normal  Pulse: present  Swelling: none  Effusion: no effusion present    Comments:  Reduced bakers cyst  Mildly tender to palpation medial joint line  Skin is warm and dry to touch with no signs of erythema, ecchymosis, or infection   Flexor and extensor mechanisms are intact   Knee is stable to varus and valgus stress  Patella tracks centrally with crepitance  Calf compartments are soft and supple  2+ DP and PT pulses with brisk capillary refill to the toes  Sural, saphenous, tibial, superficial, and deep peroneal motor and sensory distributions intact  Sensation light touch intact distally            Observations   Left Knee   Negative for effusion.       There were no vitals filed for this visit.    Subjective:  Past Medical History:   Diagnosis Date    Cancer (HCC)     Hypertension        Past Surgical History:   Procedure Laterality Date    BREAST LUMPECTOMY         Family History   Family history unknown: Yes         Current Outpatient Medications:     ammonium lactate (LAC-HYDRIN) 12 % lotion, APPLY TO DRY SKIN ON TRUNK AND ARMS, Disp: , Rfl:     atorvastatin (LIPITOR) 10 mg tablet, Take 10 mg by mouth daily, Disp: , Rfl:     Calcium 200 MG TABS, Take by mouth Three times a day, Disp: , Rfl:     Cholecalciferol 50 MCG (2000 UT) CAPS, Take by mouth, Disp: , Rfl:     clobetasol (TEMOVATE) 0.05 % cream, Apply 1 application. topically 2 (two) times a day, Disp: , Rfl:     lisinopril-hydrochlorothiazide (PRINZIDE,ZESTORETIC) 10-12.5 MG per tablet, TAKE 1 TABLET DAILY (LUPIN), Disp: , Rfl:     meloxicam (MOBIC) 7.5 mg tablet, Take 1 tablet (7.5 mg total) by mouth daily as needed for mild pain or moderate pain, Disp: 30 tablet, Rfl: 1     omeprazole (PriLOSEC) 40 MG capsule, Take 40 mg by mouth, Disp: , Rfl:     Allergies   Allergen Reactions    Penicillins Hives and Rash       Review of Systems   Constitutional:  Positive for activity change. Negative for chills, fever and unexpected weight change.   HENT:  Negative for hearing loss, nosebleeds and sore throat.    Eyes:  Negative for pain, redness and visual disturbance.   Respiratory:  Negative for cough, shortness of breath and wheezing.    Cardiovascular:  Negative for chest pain, palpitations and leg swelling.   Gastrointestinal:  Negative for abdominal pain, nausea and vomiting.   Endocrine: Negative for polydipsia and polyuria.   Genitourinary:  Negative for dysuria and hematuria.   Musculoskeletal:  See HPI  Skin:  Negative for rash and wound.   Neurological:  Negative for dizziness, numbness and headaches.   Psychiatric/Behavioral:  Negative for decreased concentration and suicidal ideas. The patient is not nervous/anxious.      Physical Exam  Vitals and nursing note reviewed.   Constitutional:       Appearance: Normal appearance. She is well-developed.   HENT:      Head: Normocephalic and atraumatic.      Right Ear: External ear normal.      Left Ear: External ear normal.   Eyes:      General: No scleral icterus.     Extraocular Movements: Extraocular movements intact.      Conjunctiva/sclera: Conjunctivae normal.   Cardiovascular:      Rate and Rhythm: Normal rate.   Pulmonary:      Effort: Pulmonary effort is normal. No respiratory distress.   Musculoskeletal:      Cervical back: Normal range of motion and neck supple.      Comments: See Ortho exam   Skin:     General: Skin is warm and dry.   Neurological:      General: No focal deficit present.      Mental Status: She is alert and oriented to person, place, and time.   Psychiatric:         Behavior: Behavior normal.     Scribe Attestation      I,:  Kaycee Barnett am acting as a scribe while in the presence of the attending physician.:        I,:  Wali De La Fuente, DO personally performed the services described in this documentation    as scribed in my presence.:           This document was created using speech voice recognition software.   Grammatical errors, random word insertions, pronoun errors, and incomplete sentences are an occasional consequence of this system due to software limitations, ambient noise, and hardware issues.   Any formal questions or concerns about content, text, or information contained within the body of this dictation should be directly addressed to the provider for clarification.

## 2025-03-24 ENCOUNTER — OFFICE VISIT (OUTPATIENT)
Dept: PHYSICAL THERAPY | Facility: CLINIC | Age: 75
End: 2025-03-24
Payer: MEDICARE

## 2025-03-24 DIAGNOSIS — G89.29 CHRONIC PAIN OF LEFT KNEE: Primary | ICD-10-CM

## 2025-03-24 DIAGNOSIS — M17.12 PRIMARY OSTEOARTHRITIS OF LEFT KNEE: ICD-10-CM

## 2025-03-24 DIAGNOSIS — M54.50 RIGHT-SIDED LOW BACK PAIN WITHOUT SCIATICA, UNSPECIFIED CHRONICITY: ICD-10-CM

## 2025-03-24 DIAGNOSIS — M25.562 CHRONIC PAIN OF LEFT KNEE: Primary | ICD-10-CM

## 2025-03-24 PROCEDURE — 97110 THERAPEUTIC EXERCISES: CPT

## 2025-03-24 PROCEDURE — 97112 NEUROMUSCULAR REEDUCATION: CPT

## 2025-03-24 NOTE — PROGRESS NOTES
"Daily Note     Today's date: 3/24/2025  Patient name: Dominga Bender  : 1950  MRN: [id number]  Referring provider: Wali De La Fuente DO  Dx:   Encounter Diagnosis     ICD-10-CM    1. Chronic pain of left knee  M25.562     G89.29       2. Primary osteoarthritis of left knee  M17.12       3. Right-sided low back pain without sciatica, unspecified chronicity  M54.50                      Subjective: pt reports that things are feeling pretty good this morning, she saw the MD and says her bakers cyst is smaller and her pain has been less. She has no knee or LBP on arrival to session and cont w/ relief w/ CRESENCIO performing 3-4x/day       Objective: See treatment diary below      Assessment: Tolerated treatment well. Increased reps today w/ additional LE strengthening w/ STS and bridging w/o complaints. Pt to assess tolerance to increased reps and additional TE next visit. Patient would benefit from continued PT      Plan: Continue per plan of care.      Precautions:   Past Medical History:   Diagnosis Date    Cancer (HCC)     Hypertension      Past Surgical History:   Procedure Laterality Date    BREAST LUMPECTOMY       SOC: 2025  FOTO: 2025  POC Expiration: 2025  Daily Treatment Log:  Date 3/19/2025 3/24/2025  3/10/2025 3/12/2025   Visit # 6 7   4 5 (FOTO)    Auth         Auth exp        Manual                        There Exer 15' 25'   23' 20'   LAQ  W/ add 3\" x10 R/L alt  W/ add 3\" x10 R/L alt; 2x  W/ add 3\" x10 W/ add 3\" x10 R/L    Std hip abd  YTB 1x10 R/L alt  YTB @ knees 1x10 R/L alt; 2x   1x10 R/L  YTB 1x10 R/L    Std hip ext  YTB 1x10 R/L alt  YTB @ knees 1x10 R/L alt; 2x   1x10 R/L   YTB 1x10 R/L    Supine Leg press  Hold    B/L 10# 1x10  Seated B/L 10# 1x10 (less discomfort in shoulders)    Figure 4 stretch  15\" x3 R/L  15\"x3 R/L   15\" x3 R/L  15\" x3 R/L    Std hip flexor stretch  Std at counter 15\" x3 R/L alt  Std at counter 15\" x3 R/L   Std at counter 15\" x3 R/L alt  Std at counter 15\" x3 " "R/L alt    Hooklying clamshell  YTB 1x10  RTB 1x15                       Pt edu      Instructed to do more of the CRESENCIO than prone press up if she feels it gives her a better response. May need to increase frequency or reps as needed for pain level.    HEP Updated and discussed    Updated and discussed prone press up vs qped hip drop for knee pain    There Activ 5'       STS   Elevated mat 1x10; 2x       FSU  Step tap 8\" step 1x10 R/L alt  FSU 8\" 1x10 R/L       LSU  4\" step 1x10 R/L  4\" 1x10 R/L                               NMReed 15' 15'   15' 15'   Monster walk         Side stepping         HR/TR  1x10 ea  HR off step B/L UE support 1x10   1x10 ea  1x10 ea   SLR flex w/ QS  1x10 R/L  1x10 R/L; 2x   1x10 R/L  1x10 R/L    Bridges   1x10; 2x       TA brace supine  3\" x10        CRESENCIO  1x10  1x10 to end    1x10    Palloff press  Hold  Seated RTB 1x10 R/L   Dbl red tubing 1x10 ea way  Sanjiv 5# 1x10 ea way    Qped hip drop    Prone press up w/ exhale 1x10     Sciatic N glide  1x10 R/L  1x10 R/L   1x10 R/L  1x10 R/L           Mechanical assessment         Modalities                                HEP:   Access Code: [email or web address]  URL: [email or web address]  Date: 03/24/2025  Prepared by: Gwen Alegre    Program Notes  if you do extras of the prone press ups you can do less of the standing extension. The goal is a total of 5x a day minimum. Can do more if needed     Exercises  - Standing Lumbar Extension with Counter  - 3 x daily - 7 x weekly - 1 sets - 10 reps  - Seated Long Arc Quad  - 1 x daily - 7 x weekly - 1 sets - 10 reps  - Standing Hip Abduction with Counter Support  - 1 x daily - 7 x weekly - 1 sets - 10 reps  - Standing Hip Extension with Counter Support  - 1 x daily - 7 x weekly - 1 sets - 10 reps  - Standing Hip Flexor Stretch  - 1 x daily - 7 x weekly - 1 sets - 5 reps - 10 sec hold  - Hooklying Clamshell with Resistance  - 1 x daily - 7 x weekly - 1 sets - 10 reps  - Supine Bridge  - 1 x daily - " 7 x weekly - 2 sets - 10 reps  - Supine Active Straight Leg Raise  - 1 x daily - 7 x weekly - 2 sets - 10 reps

## 2025-03-26 ENCOUNTER — OFFICE VISIT (OUTPATIENT)
Dept: PHYSICAL THERAPY | Facility: CLINIC | Age: 75
End: 2025-03-26
Payer: MEDICARE

## 2025-03-26 DIAGNOSIS — M25.562 CHRONIC PAIN OF LEFT KNEE: Primary | ICD-10-CM

## 2025-03-26 DIAGNOSIS — M54.50 RIGHT-SIDED LOW BACK PAIN WITHOUT SCIATICA, UNSPECIFIED CHRONICITY: ICD-10-CM

## 2025-03-26 DIAGNOSIS — M17.12 PRIMARY OSTEOARTHRITIS OF LEFT KNEE: ICD-10-CM

## 2025-03-26 DIAGNOSIS — G89.29 CHRONIC PAIN OF LEFT KNEE: Primary | ICD-10-CM

## 2025-03-26 PROCEDURE — 97112 NEUROMUSCULAR REEDUCATION: CPT

## 2025-03-26 PROCEDURE — 97110 THERAPEUTIC EXERCISES: CPT

## 2025-03-26 NOTE — PROGRESS NOTES
"Daily Note     Today's date: 3/26/2025  Patient name: Dominga Bender  : 1950  MRN: [id number]  Referring provider: Wali De La Fuente DO  Dx:   Encounter Diagnosis     ICD-10-CM    1. Chronic pain of left knee  M25.562     G89.29       2. Primary osteoarthritis of left knee  M17.12       3. Right-sided low back pain without sciatica, unspecified chronicity  M54.50                      Subjective: pt reports that she had some increased knee pain after and some LBP but not more than usual. On arrival to session she had 3/10 pain in L calf, she did perform her CRESENCIO this morning and didn't seem to effect this       Objective: See treatment diary below      Assessment: Tolerated treatment well. Pt dem abolished calf symptoms w/ ext based movements w/ prone hip drops, this however did hurt her L knee, pt was advised to trial them at the wall when/if she gets that calf pain again to assess tolerance as she did not have any change to this w/ the CRESENCIO over fulcrum this morning. Dec FSU and WB activities today due to reported increased knee pain after last session, cont to monitor and progress as tolerated. Patient would benefit from continued PT      Plan: Continue per plan of care.      Precautions:   Past Medical History:   Diagnosis Date    Cancer (HCC)     Hypertension      Past Surgical History:   Procedure Laterality Date    BREAST LUMPECTOMY       SOC: 2025  FOTO: 2025  POC Expiration: 2025  Daily Treatment Log:  Date 3/19/2025 3/24/2025 3/26/2025  3/12/2025   Visit # 6 7  8   5 (FOTO)    Auth         Auth exp        Manual                        There Exer 15' 25'  15'   20'   LAQ  W/ add 3\" x10 R/L alt  W/ add 3\" x10 R/L alt; 2x   W/ add 3\" x10 R/L    Std hip abd  YTB 1x10 R/L alt  YTB @ knees 1x10 R/L alt; 2x  YTB @ knees 1x10 R/L; 2x   YTB 1x10 R/L    Std hip ext  YTB 1x10 R/L alt  YTB @ knees 1x10 R/L alt; 2x  YTB @ knees 1x10 R/L; 2x   YTB 1x10 R/L    Supine Leg press  Hold     Seated B/L 10# " "1x10 (less discomfort in shoulders)    Figure 4 stretch  15\" x3 R/L  15\"x3 R/L  15\"x3 R/L   15\" x3 R/L    Std hip flexor stretch  Std at counter 15\" x3 R/L alt  Std at counter 15\" x3 R/L  Std at counter 15\"x3 R/L   Std at counter 15\" x3 R/L alt    Hooklying clamshell  YTB 1x10  RTB 1x15  GTB 1x10; 2x                     Pt edu      Instructed to do more of the CRESENCIO than prone press up if she feels it gives her a better response. May need to increase frequency or reps as needed for pain level.    HEP Updated and discussed        There Activ 5'       STS   Elevated mat 1x10; 2x       FSU  Step tap 8\" step 1x10 R/L alt  FSU 8\" 1x10 R/L       LSU  4\" step 1x10 R/L  4\" 1x10 R/L                               NMReed 15' 15'  25'   15'   Monster walk         Side stepping         HR/TR  1x10 ea  HR off step B/L UE support 1x10  HR/TR 15x ea   1x10 ea   SLR flex w/ QS  1x10 R/L  1x10 R/L; 2x    1x10 R/L    Bridges   1x10; 2x  5\" 1x10; 2x      TA brace supine  3\" x10        CRESENCIO  1x10  1x10 to end    1x10    Palloff press  Hold  Seated RTB 1x10 R/L  Std dlb red tubing 1x10 R/L   Sanjiv 5# 1x10 ea way    Qped hip drop   W/ exhale 1x10= abolished calf; irritated knee      Hip drops @ wall 1x5; 1x10 to end      Sciatic N glide  1x10 R/L  1x10 R/L  1x10 R/L   1x10 R/L           Mechanical assessment         Modalities                                HEP:   Access Code: [email or web address]  URL: [email or web address]  Date: 03/26/2025  Prepared by: Gwen Alegre    Program Notes  if you do extras of the prone press ups you can do less of the standing extension. The goal is a total of 5x a day minimum. Can do more if needed     Exercises  - Standing Lumbar Extension with Counter  - 3-5 x daily - 7 x weekly - 1 sets - 10 reps  - Standing Lumbar Extension at Wall - Forearms  - 3-5 x daily - 7 x weekly - 1 sets - 10 reps  - Seated Long Arc Quad  - 1 x daily - 7 x weekly - 1 sets - 10 reps  - Standing Hip Abduction with Counter " Support  - 1 x daily - 7 x weekly - 1 sets - 10 reps  - Standing Hip Extension with Counter Support  - 1 x daily - 7 x weekly - 1 sets - 10 reps  - Standing Hip Flexor Stretch  - 1 x daily - 7 x weekly - 1 sets - 5 reps - 10 sec hold  - Hooklying Clamshell with Resistance  - 1 x daily - 7 x weekly - 1 sets - 10 reps  - Supine Bridge  - 1 x daily - 7 x weekly - 2 sets - 10 reps  - Supine Active Straight Leg Raise  - 1 x daily - 7 x weekly - 2 sets - 10 reps

## 2025-04-02 ENCOUNTER — EVALUATION (OUTPATIENT)
Dept: PHYSICAL THERAPY | Facility: CLINIC | Age: 75
End: 2025-04-02
Payer: MEDICARE

## 2025-04-02 DIAGNOSIS — M25.562 CHRONIC PAIN OF LEFT KNEE: Primary | ICD-10-CM

## 2025-04-02 DIAGNOSIS — M54.50 RIGHT-SIDED LOW BACK PAIN WITHOUT SCIATICA, UNSPECIFIED CHRONICITY: ICD-10-CM

## 2025-04-02 DIAGNOSIS — G89.29 CHRONIC PAIN OF LEFT KNEE: Primary | ICD-10-CM

## 2025-04-02 DIAGNOSIS — M17.12 PRIMARY OSTEOARTHRITIS OF LEFT KNEE: ICD-10-CM

## 2025-04-02 PROCEDURE — 97112 NEUROMUSCULAR REEDUCATION: CPT

## 2025-04-02 PROCEDURE — 97110 THERAPEUTIC EXERCISES: CPT

## 2025-04-02 NOTE — PROGRESS NOTES
PT Re-Evaluation     Today's date: 2025  Patient name: Dominga Bender  : 1950  MRN: [id number]  Referring provider: Wali De La Fuente DO  Dx:   Encounter Diagnosis     ICD-10-CM    1. Chronic pain of left knee  M25.562     G89.29       2. Primary osteoarthritis of left knee  M17.12       3. Right-sided low back pain without sciatica, unspecified chronicity  M54.50                        Assessment  Impairments: abnormal gait, abnormal or restricted ROM, activity intolerance, impaired physical strength and pain with function    Assessment details: Dominga Bender is a 75 y.o. female who presents for re-eval of L knee and R sided low back pain. Patient demo improvements in pain levels, B/L LE strength, L knee ext ROM, ambulation tolerance. Some deficits are still present at this time. Due to these impairments, patient has difficulty performing ADL's, recreational activities, engaging in social activities, ambulation of prolonged distances, stair negotiation at PLOF (performs with more caution and at slower pace), lifting/carrying. Mechanical assessment revealed signs of lumbar derangement with lumbar extension directional preference. Patient's clinical presentation is consistent with their referring diagnosis of Left knee pain, L knee osteoarthritis, and Right sided low back pain without sciatica. Patient has been educated in home exercise program and plan of care. Patient would benefit from skilled physical therapy services to address their aforementioned functional limitations and progress towards prior level of function and independence with home exercise program.       Goals  Short Term Goals:  to be accomplished in 4 weeks   STG1. Initiate and advance HEP to maximize progress between therapy sessions---met  STG2. Pt will demo L/S AROM < or = mod loss throughout to promote improved functional mobility and body mechanics---met  STG3. Improve B/L LE strength to 4/5 to allow pt to raise from sit to stand w/o  UE assist.---met  STG4. Reduce pain w/ WB activity to 0-4/10.---met  STG5. Pt will deny sleep disturbance secondary to pain ---progressing towards    Long Term Goals:  Target Date 12 weeks ---progressing towards all   LTG1. Pt to be Indep with HEP  to maximize progress between therapy sessions and improve functional mobility  LTG2. Pt will demo L knee ROM of 0-130 needed for reciprocal stairs w/ handrail   LTG3.Pt to demo B/L LE strength of 4+/5 or better such that pt can perform reciprocal stair negotiation  LTG4. Pt will return to household ADLs and work duties pain free as per PLOF  LTG5. Pt will demo Good strength core stabilizers to promote carryover with body mech and posture  LTG6. Pt will demo L/S AROM < or = min loss throughout to promote improved functional mobility and body mechanics          Plan  Patient would benefit from: skilled physical therapy  Planned modality interventions: cryotherapy, thermotherapy: hydrocollator packs and unattended electrical stimulation    Planned therapy interventions: manual therapy, neuromuscular re-education, self care, therapeutic activities, therapeutic exercise, home exercise program, patient education, balance/weight bearing training, IASTM, joint mobilization, Freitas taping, kinesiology taping, postural training, strengthening, stretching, flexibility and abdominal trunk stabilization    Frequency: 2x week  Plan of Care beginning date: 2/26/2025  Plan of Care expiration date: 5/21/2025  Treatment plan discussed with: patient  Plan details: HEP development, stretching, strengthening, A/AA/PROM, joint mobilizations, posture education, STM/MI as needed to reduce muscle tension, muscle reeducation, PLOC discussed and agreed upon with patient.            Subjective Evaluation    History of Present Illness  Mechanism of injury: Patient reports to PT with concerns with L knee and R sided low back pain. Patient reports today both her back and knee are feeling good. After  last session patient had increased pain for a few days. Generally the knee and back have been around a 3/10 but not constant. The knee does bother her at night sometimes. Patient has not been able to bridges at home due to soft bed  Patient Goals  Patient goals for therapy: decreased pain, increased motion, increased strength, improved balance, independence with ADLs/IADLs and return to sport/leisure activities    Pain  Current pain ratin (both knee and back)  At worst pain rating: 3 (both knee and back)  Quality: sharp and dull ache  Relieving factors: heat, medications and ice  Aggravating factors: stair climbing, walking and standing  Progression: no change    Social Support  Steps to enter house: yes  Stairs in house: yes   Lives in: multiple-level home  Lives with: spouse      Diagnostic Tests  Abnormal x-ray: L knee: Moderate medial compartment degenerative changes as above with mild varus angulation. No fracture. Small effusion.  Abnormal MRI: multiple disc bulges and stenosis.  Treatments  Previous treatment: physical therapy and chiropractic        Objective    (*=pain)    Lumbar ROM limitations   Flexion: min-mod bedoya  Extension: mod bedoya  RSG: min bedoya   LSG: min bedoya     Mechanical Assessment: has been responding well to CRESENCIO at home.       Knee AROM: deg      R  L  Extension: (sup)   0  0  Flexion: (sup/prone)  130/110 125/100      Strength: MMT  Hip    R  L    IR(seated)   4/5  4/5  ER(seated)   4/5  4/5   Flexion(sup)   4/5  4/5  Abduction(S/L)  4/5  4/5  Extension (prone)  4-/5  4-/5    Knee    R  L    Extension(Seated)  4+/5  4+/5  Flexion(Seated)  4/5  4/5        Flexibility:  HS WNL  Min bedoya in B/L gastroc   Mod limin B/L quads/hip flexors       Patellar joint mobility:   mild hypomobility in L patella vs R     Tenderness/Palpation:  No TTP noted     Special Tests: L knee   Valgus Stress: neg  Varus Stress: neg   McMurrary's IR of the tibia + Varus stress = lateral meniscus: neg   McMurrays ER of  "the tibia + Valgus stress = medial meniscus: neg   Lachman: neg   Post drawer: neg       Function:   STS: able to perform with mild difficulty   Ambulation: improved gait pattern noted today              Precautions:   Past Medical History:   Diagnosis Date    Cancer (HCC)     Hypertension      Past Surgical History:   Procedure Laterality Date    BREAST LUMPECTOMY       SOC: 2/26/2025  FOTO: 2/26/2025  POC Expiration: 5/21/2025  Daily Treatment Log:  Date 3/19/2025 3/24/2025 3/26/2025 4/2/2025    Visit # 6 7  8  9 (RE/FOTO)     Auth         Auth exp        Manual                        There Exer 15' 25'  15'  20'    LAQ  W/ add 3\" x10 R/L alt  W/ add 3\" x10 R/L alt; 2x      Std hip abd  YTB 1x10 R/L alt  YTB @ knees 1x10 R/L alt; 2x  YTB @ knees 1x10 R/L; 2x  YTB @ knees 1x10 R/L    Std hip ext  YTB 1x10 R/L alt  YTB @ knees 1x10 R/L alt; 2x  YTB @ knees 1x10 R/L; 2x  YTB @ knees 1x10 R/L    Supine Leg press  Hold        Figure 4 stretch  15\" x3 R/L  15\"x3 R/L  15\"x3 R/L  15\" x3 R/L     Std hip flexor stretch  Std at counter 15\" x3 R/L alt  Std at counter 15\" x3 R/L  Std at counter 15\"x3 R/L  Std at counter 15\"x3 R/L     Hooklying clamshell  YTB 1x10  RTB 1x15  GTB 1x10; 2x GTB 1x10; 2x                    Pt edu         Objective measures     EG    HEP Updated and discussed        There Activ 5'       STS   Elevated mat 1x10; 2x       FSU  Step tap 8\" step 1x10 R/L alt  FSU 8\" 1x10 R/L       LSU  4\" step 1x10 R/L  4\" 1x10 R/L                               NMReed 15' 15'  25'  15'    Monster walk         Side stepping         HR/TR  1x10 ea  HR off step B/L UE support 1x10  HR/TR 15x ea  HR/TR 15x ea     SLR flex w/ QS  1x10 R/L  1x10 R/L; 2x       Bridges   1x10; 2x  5\" 1x10; 2x  3\" x10     TA brace supine  3\" x10        CRESENCIO  1x10  1x10 to end       Palloff press  Hold  Seated RTB 1x10 R/L  Std dlb red tubing 1x10 R/L      Qped hip drop   W/ exhale 1x10= abolished calf; irritated knee      Hip drops @ wall 1x5; " 1x10 to end      Sciatic N glide  1x10 R/L  1x10 R/L  1x10 R/L  Seated 1x10 R/L             Mechanical assessment         Modalities                                HEP:   Access Code: [email or web address]  URL: [email or web address]  Date: 04/02/2025  Prepared by: Siena Zabala    Exercises  - Standing Lumbar Extension with Counter  - 3-5 x daily - 7 x weekly - 1 sets - 10 reps  - Standing Lumbar Extension at Wall - Forearms  - 3-5 x daily - 7 x weekly - 1 sets - 10 reps  - Seated Long Arc Quad  - 1 x daily - 7 x weekly - 1 sets - 10 reps  - Standing Hip Abduction with Counter Support  - 1 x daily - 7 x weekly - 1 sets - 10 reps  - Standing Hip Extension with Counter Support  - 1 x daily - 7 x weekly - 1 sets - 10 reps  - Heel Toe Raises with Counter Support  - 1 x daily - 7 x weekly - 1 sets - 10 reps  - Standing Hip Flexor Stretch  - 1 x daily - 7 x weekly - 1 sets - 3 reps - 15 sec hold  - Hooklying Clamshell with Resistance  - 1 x daily - 7 x weekly - 1 sets - 10 reps  - Supine Active Straight Leg Raise  - 1 x daily - 7 x weekly - 2 sets - 10 reps

## 2025-04-08 ENCOUNTER — OFFICE VISIT (OUTPATIENT)
Dept: PHYSICAL THERAPY | Facility: CLINIC | Age: 75
End: 2025-04-08
Payer: MEDICARE

## 2025-04-08 DIAGNOSIS — M25.562 CHRONIC PAIN OF LEFT KNEE: Primary | ICD-10-CM

## 2025-04-08 DIAGNOSIS — G89.29 CHRONIC PAIN OF LEFT KNEE: Primary | ICD-10-CM

## 2025-04-08 DIAGNOSIS — M17.12 PRIMARY OSTEOARTHRITIS OF LEFT KNEE: ICD-10-CM

## 2025-04-08 DIAGNOSIS — M54.50 RIGHT-SIDED LOW BACK PAIN WITHOUT SCIATICA, UNSPECIFIED CHRONICITY: ICD-10-CM

## 2025-04-08 PROCEDURE — 97112 NEUROMUSCULAR REEDUCATION: CPT

## 2025-04-08 PROCEDURE — 97110 THERAPEUTIC EXERCISES: CPT

## 2025-04-08 NOTE — PROGRESS NOTES
"Daily Note     Today's date: 2025  Patient name: Dominga Bender  : 1950  MRN: [id number]  Referring provider: Wali De La Fuente DO  Dx:   Encounter Diagnosis     ICD-10-CM    1. Chronic pain of left knee  M25.562     G89.29       2. Primary osteoarthritis of left knee  M17.12       3. Right-sided low back pain without sciatica, unspecified chronicity  M54.50                      Subjective: Patient reports she is doing ok today, but does still get woken up at night sometimes because of knee pain. Discussed sleeping positions to limit pressure on knee.       Objective: See treatment diary below      Assessment: Tolerated treatment well with re-introduction of FSU And LSU today. Patient to report back on tolerance next visit. Will progress as tolerated in future. Patient would benefit from continued PT      Plan: Continue per plan of care.      Precautions:   Past Medical History:   Diagnosis Date    Cancer (HCC)     Hypertension      Past Surgical History:   Procedure Laterality Date    BREAST LUMPECTOMY       SOC: 2025  FOTO: 2025  POC Expiration: 2025  Daily Treatment Log:  Date 3/19/2025 3/24/2025 3/26/2025 2025 2025   Visit # 6 7  8  9 (RE/FOTO)  10    Auth         Auth exp        Manual                        There Exer 15' 25'  15'  20' 20'   LAQ  W/ add 3\" x10 R/L alt  W/ add 3\" x10 R/L alt; 2x      Std hip abd  YTB 1x10 R/L alt  YTB @ knees 1x10 R/L alt; 2x  YTB @ knees 1x10 R/L; 2x  YTB @ knees 1x10 R/L YTB @ knees 1x10 R/L   Std hip ext  YTB 1x10 R/L alt  YTB @ knees 1x10 R/L alt; 2x  YTB @ knees 1x10 R/L; 2x  YTB @ knees 1x10 R/L YTB @ knees 1x10 R/L   Supine Leg press  Hold        Figure 4 stretch  15\" x3 R/L  15\"x3 R/L  15\"x3 R/L  15\" x3 R/L  15\" x3 R/L    Std hip flexor stretch  Std at counter 15\" x3 R/L alt  Std at counter 15\" x3 R/L  Std at counter 15\"x3 R/L  Std at counter 15\"x3 R/L  Std at counter 15\"x3 R/L    Vinh tavera  YTB 1x10  RTB 1x15  GTB 1x10; 2x GTB " "1x10; 2x GTB 1x10; 2x   Slant board      15\" x3 B/L            Pt edu         Objective measures     EG    HEP Updated and discussed        There Activ 5'       STS   Elevated mat 1x10; 2x       FSU  Step tap 8\" step 1x10 R/L alt  FSU 8\" 1x10 R/L    6\" step 1x10 R/L    LSU  4\" step 1x10 R/L  4\" 1x10 R/L    4\" step 1x10                            NMReed 15' 15'  25'  15' 15'   Monster walk         Side stepping         HR/TR  1x10 ea  HR off step B/L UE support 1x10  HR/TR 15x ea  HR/TR 15x ea  HR/TR 15x ea    SLR flex w/ QS  1x10 R/L  1x10 R/L; 2x    1x10 R/L; 2x    Bridges   1x10; 2x  5\" 1x10; 2x  3\" x10  3\" x10    TA brace supine  3\" x10        CRESENCIO  1x10  1x10 to end       Palloff press  Hold  Seated RTB 1x10 R/L  Std dlb red tubing 1x10 R/L      Qped hip drop   W/ exhale 1x10= abolished calf; irritated knee      Hip drops @ wall 1x5; 1x10 to end      Sciatic N glide  1x10 R/L  1x10 R/L  1x10 R/L  Seated 1x10 R/L  Seated 1x10 R/L            Mechanical assessment         Modalities                                HEP:   Access Code: [email or web address]  URL: [email or web address]  Date: 04/02/2025  Prepared by: Siena Zabala    Exercises  - Standing Lumbar Extension with Counter  - 3-5 x daily - 7 x weekly - 1 sets - 10 reps  - Standing Lumbar Extension at Wall - Forearms  - 3-5 x daily - 7 x weekly - 1 sets - 10 reps  - Seated Long Arc Quad  - 1 x daily - 7 x weekly - 1 sets - 10 reps  - Standing Hip Abduction with Counter Support  - 1 x daily - 7 x weekly - 1 sets - 10 reps  - Standing Hip Extension with Counter Support  - 1 x daily - 7 x weekly - 1 sets - 10 reps  - Heel Toe Raises with Counter Support  - 1 x daily - 7 x weekly - 1 sets - 10 reps  - Standing Hip Flexor Stretch  - 1 x daily - 7 x weekly - 1 sets - 3 reps - 15 sec hold  - Hooklying Clamshell with Resistance  - 1 x daily - 7 x weekly - 1 sets - 10 reps  - Supine Active Straight Leg Raise  - 1 x daily - 7 x weekly - 2 sets - 10 reps       "

## 2025-04-10 ENCOUNTER — OFFICE VISIT (OUTPATIENT)
Dept: PHYSICAL THERAPY | Facility: CLINIC | Age: 75
End: 2025-04-10
Payer: MEDICARE

## 2025-04-10 DIAGNOSIS — M17.12 PRIMARY OSTEOARTHRITIS OF LEFT KNEE: ICD-10-CM

## 2025-04-10 DIAGNOSIS — M54.50 RIGHT-SIDED LOW BACK PAIN WITHOUT SCIATICA, UNSPECIFIED CHRONICITY: ICD-10-CM

## 2025-04-10 DIAGNOSIS — G89.29 CHRONIC PAIN OF LEFT KNEE: Primary | ICD-10-CM

## 2025-04-10 DIAGNOSIS — M25.562 CHRONIC PAIN OF LEFT KNEE: Primary | ICD-10-CM

## 2025-04-10 PROCEDURE — 97110 THERAPEUTIC EXERCISES: CPT

## 2025-04-10 PROCEDURE — 97112 NEUROMUSCULAR REEDUCATION: CPT

## 2025-04-10 NOTE — PROGRESS NOTES
"Daily Note     Today's date: 4/10/2025  Patient name: Dominga Bender  : 1950  MRN: [id number]  Referring provider: Wali De La Fuente DO  Dx:   Encounter Diagnosis     ICD-10-CM    1. Chronic pain of left knee  M25.562     G89.29       2. Primary osteoarthritis of left knee  M17.12       3. Right-sided low back pain without sciatica, unspecified chronicity  M54.50                      Subjective: Patient reports no new concerns at this time.       Objective: See treatment diary below      Assessment: Tolerated treatment well with re-introduction of palloff press with decrease in reps for tolerance. Patient to report back if there is any post op irritation to lats similar to last session presses were performed. Patient doing well with current program and tentative plan to DC at end of scheduled visits.  Patient would benefit from continued PT      Plan: Continue per plan of care. Potential DC next week.      Precautions:   Past Medical History:   Diagnosis Date    Cancer (HCC)     Hypertension      Past Surgical History:   Procedure Laterality Date    BREAST LUMPECTOMY       SOC: 2025  FOTO: 2025  POC Expiration: 2025  Daily Treatment Log:  Date 4/10/2025  3/26/2025 2025 2025   Visit # 11  8  9 (RE/FOTO)  10    Auth         Auth exp        Manual                        There Exer 20'  15'  20' 20'   LAQ         Std hip abd  YTB @ knees 1x10 R/L  YTB @ knees 1x10 R/L; 2x  YTB @ knees 1x10 R/L YTB @ knees 1x10 R/L   Std hip ext  YTB @ knees 1x10 R/L  YTB @ knees 1x10 R/L; 2x  YTB @ knees 1x10 R/L YTB @ knees 1x10 R/L   Supine Leg press         Figure 4 stretch  15\" x3 R/L   15\"x3 R/L  15\" x3 R/L  15\" x3 R/L    Std hip flexor stretch  Std at counter 15\"x3 R/L   Std at counter 15\"x3 R/L  Std at counter 15\"x3 R/L  Std at counter 15\"x3 R/L    Hooklying clamshell  GTB 1x10; 2x  GTB 1x10; 2x GTB 1x10; 2x GTB 1x10; 2x   Slant board  15\" x3 B/L     15\" x3 B/L    Seated HS curl  YTB 1x10 R/L        Pt " "edu         Objective measures     EG    HEP        There Activ        STS         FSU  6\" step 1x10 R/L     6\" step 1x10 R/L    LSU  6\" step 1x10 R/L     4\" step 1x10                            NMReed 15'  25'  15' 15'   Monster walk         Side stepping         HR/TR  15x ea   HR/TR 15x ea  HR/TR 15x ea  HR/TR 15x ea    SLR flex w/ QS  1x10 R/L ; 2x    1x10 R/L; 2x    Bridges  3\" x10   5\" 1x10; 2x  3\" x10  3\" x10    TA brace supine         CRESENCIO         Palloff press  std dlb red tubing 1x5 R/L  Std dlb red tubing 1x10 R/L      Qped hip drop   W/ exhale 1x10= abolished calf; irritated knee      Hip drops @ wall 1x5; 1x10 to end      Sciatic N glide  Seated 1x10 R/L   1x10 R/L  Seated 1x10 R/L  Seated 1x10 R/L            Mechanical assessment         Modalities                                HEP:   Access Code: [email or web address]  URL: [email or web address]  Date: 04/02/2025  Prepared by: Siena Zabala    Exercises  - Standing Lumbar Extension with Counter  - 3-5 x daily - 7 x weekly - 1 sets - 10 reps  - Standing Lumbar Extension at Wall - Forearms  - 3-5 x daily - 7 x weekly - 1 sets - 10 reps  - Seated Long Arc Quad  - 1 x daily - 7 x weekly - 1 sets - 10 reps  - Standing Hip Abduction with Counter Support  - 1 x daily - 7 x weekly - 1 sets - 10 reps  - Standing Hip Extension with Counter Support  - 1 x daily - 7 x weekly - 1 sets - 10 reps  - Heel Toe Raises with Counter Support  - 1 x daily - 7 x weekly - 1 sets - 10 reps  - Standing Hip Flexor Stretch  - 1 x daily - 7 x weekly - 1 sets - 3 reps - 15 sec hold  - Hooklying Clamshell with Resistance  - 1 x daily - 7 x weekly - 1 sets - 10 reps  - Supine Active Straight Leg Raise  - 1 x daily - 7 x weekly - 2 sets - 10 reps       "

## 2025-04-15 ENCOUNTER — OFFICE VISIT (OUTPATIENT)
Dept: PHYSICAL THERAPY | Facility: CLINIC | Age: 75
End: 2025-04-15
Attending: ORTHOPAEDIC SURGERY
Payer: MEDICARE

## 2025-04-15 DIAGNOSIS — M17.12 PRIMARY OSTEOARTHRITIS OF LEFT KNEE: ICD-10-CM

## 2025-04-15 DIAGNOSIS — G89.29 CHRONIC PAIN OF LEFT KNEE: Primary | ICD-10-CM

## 2025-04-15 DIAGNOSIS — M54.50 RIGHT-SIDED LOW BACK PAIN WITHOUT SCIATICA, UNSPECIFIED CHRONICITY: ICD-10-CM

## 2025-04-15 DIAGNOSIS — M25.562 CHRONIC PAIN OF LEFT KNEE: Primary | ICD-10-CM

## 2025-04-15 PROCEDURE — 97112 NEUROMUSCULAR REEDUCATION: CPT

## 2025-04-15 PROCEDURE — 97110 THERAPEUTIC EXERCISES: CPT

## 2025-04-15 NOTE — PROGRESS NOTES
"Daily Note     Today's date: 4/15/2025  Patient name: Dominga Bender  : 1950  MRN: [id number]  Referring provider: Wali De La Fuente DO  Dx:   Encounter Diagnosis     ICD-10-CM    1. Chronic pain of left knee  M25.562     G89.29       2. Primary osteoarthritis of left knee  M17.12       3. Right-sided low back pain without sciatica, unspecified chronicity  M54.50                      Subjective: Patient reports 2-3/10 pain today but feels she is managing ok with HEP.       Objective: See treatment diary below      Assessment: Tolerated treatment well with no increase in pain t/o session. Patient to report back if there is any post session soreness or irritation. Patient doing well with current program and tentative plan to DC at next visit.  Patient would benefit from continued PT      Plan: Potential discharge next visit.     Precautions:   Past Medical History:   Diagnosis Date    Cancer (HCC)     Hypertension      Past Surgical History:   Procedure Laterality Date    BREAST LUMPECTOMY       SOC: 2025  FOTO: 2025  POC Expiration: 2025  Daily Treatment Log:  Date 4/10/2025 4/15/2025  2025 2025   Visit # 11 12  9 (RE/FOTO)  10    Auth         Auth exp        Manual                        There Exer 20' 20'  20' 20'   Std hip abd  YTB @ knees 1x10 R/L YTB @ knees 1x10 R/L  YTB @ knees 1x10 R/L YTB @ knees 1x10 R/L   Std hip ext  YTB @ knees 1x10 R/L YTB @ knees 1x10 R/L  YTB @ knees 1x10 R/L YTB @ knees 1x10 R/L   Figure 4 stretch  15\" x3 R/L  15\" x3 R/L   15\" x3 R/L  15\" x3 R/L    Std hip flexor stretch  Std at counter 15\"x3 R/L  Std at counter 15\"x3 R/L   Std at counter 15\"x3 R/L  Std at counter 15\"x3 R/L    Hooklying clamshell  GTB 1x10; 2x GTB 1x10; 2x  GTB 1x10; 2x GTB 1x10; 2x   Slant board  15\" x3 B/L  15\" x3 B/L    15\" x3 B/L    Seated HS curl  YTB 1x10 R/L  YTB 1x10 R/L       Pt edu         Objective measures     EG    HEP        There Activ        STS         FSU  6\" step 1x10 " "R/L  6\" step 1x10 R/L    6\" step 1x10 R/L    LSU  6\" step 1x10 R/L  6\" step 1x10 R/L    4\" step 1x10                            NMReed 15' 10'  15' 15'   HR/TR  15x ea  15x ea  HR/TR 15x ea  HR/TR 15x ea    SLR flex w/ QS  1x10 R/L ; 2x 1x10    1x10 R/L; 2x    Bridges  3\" x10  3\" x10   3\" x10  3\" x10    TA brace supine         CRESENCIO         Palloff press  std dlb red tubing 1x5 R/L Std dlb red tubing 2x5 R/L      Qped hip drop        Sciatic N glide  Seated 1x10 R/L  Seated 1x10 R/L  Seated 1x10 R/L  Seated 1x10 R/L            Mechanical assessment         Modalities                                HEP:   Access Code: [email or web address]  URL: [email or web address]  Date: 04/02/2025  Prepared by: Siena Zabala    Exercises  - Standing Lumbar Extension with Counter  - 3-5 x daily - 7 x weekly - 1 sets - 10 reps  - Standing Lumbar Extension at Wall - Forearms  - 3-5 x daily - 7 x weekly - 1 sets - 10 reps  - Seated Long Arc Quad  - 1 x daily - 7 x weekly - 1 sets - 10 reps  - Standing Hip Abduction with Counter Support  - 1 x daily - 7 x weekly - 1 sets - 10 reps  - Standing Hip Extension with Counter Support  - 1 x daily - 7 x weekly - 1 sets - 10 reps  - Heel Toe Raises with Counter Support  - 1 x daily - 7 x weekly - 1 sets - 10 reps  - Standing Hip Flexor Stretch  - 1 x daily - 7 x weekly - 1 sets - 3 reps - 15 sec hold  - Hooklying Clamshell with Resistance  - 1 x daily - 7 x weekly - 1 sets - 10 reps  - Supine Active Straight Leg Raise  - 1 x daily - 7 x weekly - 2 sets - 10 reps       "

## 2025-04-17 NOTE — PROGRESS NOTES
PT Discharge    Today's date: 2025  Patient name: Dominga Bender  : 1950  MRN: [id number]  Referring provider: Wali De La Fuente DO  Dx:   No diagnosis found.                   Assessment  Impairments: abnormal or restricted ROM, activity intolerance and impaired physical strength    Assessment details: Dominga Bender is a 75 y.o. female who presents for re-eval of L knee and R sided low back pain. Patient demo improvements in pain levels, B/L LE strength, L knee ext ROM, ambulation tolerance. Some deficits are still present at this time. Patient continues to demo lumbar extension directional preference and feels she has been managing symptoms well at home. Patient verbalized understanding that she may return if needed in the future. Patient's clinical presentation is consistent with their referring diagnosis of Left knee pain, L knee osteoarthritis, and Right sided low back pain without sciatica. Patient has been educated in home exercise program and plan of care. Patient to DC today to HEP.     Goals  Short Term Goals:  to be accomplished in 4 weeks   STG1. Initiate and advance HEP to maximize progress between therapy sessions---met  STG2. Pt will demo L/S AROM < or = mod loss throughout to promote improved functional mobility and body mechanics---met  STG3. Improve B/L LE strength to 4/5 to allow pt to raise from sit to stand w/o UE assist.---met  STG4. Reduce pain w/ WB activity to 0-4/10.---met  STG5. Pt will deny sleep disturbance secondary to pain ---progressing towards    Long Term Goals:  Target Date 12 weeks ---progressing towards all   LTG1. Pt to be Indep with HEP  to maximize progress between therapy sessions and improve functional mobility  LTG2. Pt will demo L knee ROM of 0-130 needed for reciprocal stairs w/ handrail   LTG3.Pt to demo B/L LE strength of 4+/5 or better such that pt can perform reciprocal stair negotiation  LTG4. Pt will return to household ADLs and work duties pain free as  per PLOF  LTG5. Pt will demo Good strength core stabilizers to promote carryover with body mech and posture  LTG6. Pt will demo L/S AROM < or = min loss throughout to promote improved functional mobility and body mechanics          Plan  Patient would benefit from: skilled physical therapy  Planned modality interventions: cryotherapy, thermotherapy: hydrocollator packs and unattended electrical stimulation    Planned therapy interventions: manual therapy, neuromuscular re-education, self care, therapeutic activities, therapeutic exercise, home exercise program, patient education, balance/weight bearing training, IASTM, joint mobilization, Freitas taping, kinesiology taping, postural training, strengthening, stretching, flexibility and abdominal trunk stabilization    Frequency: 2x week  Plan of Care beginning date: 2025  Plan of Care expiration date: 2025  Treatment plan discussed with: patient  Plan details: Patient to [place] to HEP             Subjective Evaluation    History of Present Illness  Mechanism of injury: Patient reports to PT with concerns with L knee and R sided low back pain. Patient reports today both her back and knee are feeling good. After last session patient had increased pain for a few days. Generally the knee and back have been around a 3/10 but not constant. The knee does bother her at night sometimes. Patient has not been able to bridges at home due to soft bed  Patient Goals  Patient goals for therapy: decreased pain, increased motion, increased strength, improved balance, independence with ADLs/IADLs and return to sport/leisure activities    Pain  Current pain ratin (both knee and back)  At worst pain rating: 3 (both knee and back)  Quality: sharp and dull ache  Relieving factors: heat, medications and ice  Aggravating factors: stair climbing, walking and standing  Progression: no change    Social Support  Steps to enter house: yes  Stairs in house: yes   Lives in:  multiple-level home  Lives with: spouse      Diagnostic Tests  Abnormal x-ray: L knee: Moderate medial compartment degenerative changes as above with mild varus angulation. No fracture. Small effusion.  Abnormal MRI: multiple disc bulges and stenosis.  Treatments  Previous treatment: physical therapy and chiropractic        Objective    (*=pain)    Lumbar ROM limitations   Flexion: min-mod bedoya  Extension: mod bedoya  RSG: min bedoya   LSG: min bedoya     Mechanical Assessment: has been responding well to CRESENCIO at home.       Knee AROM: deg      R  L  Extension: (sup)   0  0  Flexion: (sup/prone)  130/110 125/100      Strength: MMT  Hip    R  L    IR(seated)   4/5  4/5  ER(seated)   4/5  4/5   Flexion(sup)   4/5  4/5  Abduction(S/L)  4/5  4/5  Extension (prone)  4-/5  4-/5    Knee    R  L    Extension(Seated)  4+/5  4+/5  Flexion(Seated)  4/5  4/5        Flexibility:  HS WNL  Min bedoya in B/L gastroc   Mod limin B/L quads/hip flexors       Patellar joint mobility:   mild hypomobility in L patella vs R     Tenderness/Palpation:  No TTP noted     Special Tests: L knee   Valgus Stress: neg  Varus Stress: neg   McMurrary's IR of the tibia + Varus stress = lateral meniscus: neg   McMurrays ER of the tibia + Valgus stress = medial meniscus: neg   Lachman: neg   Post drawer: neg       Function:   STS: able to perform with mild difficulty   Ambulation: improved gait pattern noted today              Precautions:   Past Medical History:   Diagnosis Date    Cancer (HCC)     Hypertension      Past Surgical History:   Procedure Laterality Date    BREAST LUMPECTOMY       SOC: 2/26/2025  FOTO: 2/26/2025  POC Expiration: 5/21/2025  Daily Treatment Log:  Date 4/10/2025 4/15/2025  4/2/2025 4/8/2025   Visit # 11 12  9 (RE/FOTO)  10    Auth         Auth exp        Manual                        There Exer 20' 20'  20' 20'   Std hip abd  YTB @ knees 1x10 R/L YTB @ knees 1x10 R/L  YTB @ knees 1x10 R/L YTB @ knees 1x10 R/L   Std hip ext  YTB @ knees  "1x10 R/L YTB @ knees 1x10 R/L  YTB @ knees 1x10 R/L YTB @ knees 1x10 R/L   Figure 4 stretch  15\" x3 R/L  15\" x3 R/L   15\" x3 R/L  15\" x3 R/L    Std hip flexor stretch  Std at counter 15\"x3 R/L  Std at counter 15\"x3 R/L   Std at counter 15\"x3 R/L  Std at counter 15\"x3 R/L    Hooklying clamshell  GTB 1x10; 2x GTB 1x10; 2x  GTB 1x10; 2x GTB 1x10; 2x   Slant board  15\" x3 B/L  15\" x3 B/L    15\" x3 B/L    Seated HS curl  YTB 1x10 R/L  YTB 1x10 R/L       Pt edu         Objective measures     EG    HEP        There Activ        STS         FSU  6\" step 1x10 R/L  6\" step 1x10 R/L    6\" step 1x10 R/L    LSU  6\" step 1x10 R/L  6\" step 1x10 R/L    4\" step 1x10                            NMReed 15' 10'  15' 15'   HR/TR  15x ea  15x ea  HR/TR 15x ea  HR/TR 15x ea    SLR flex w/ QS  1x10 R/L ; 2x 1x10    1x10 R/L; 2x    Bridges  3\" x10  3\" x10   3\" x10  3\" x10    TA brace supine         CRESENCIO         Palloff press  std dlb red tubing 1x5 R/L Std dlb red tubing 2x5 R/L      Qped hip drop        Sciatic N glide  Seated 1x10 R/L  Seated 1x10 R/L  Seated 1x10 R/L  Seated 1x10 R/L            Mechanical assessment         Modalities                                HEP:   Access Code: [email or web address]  URL: [email or web address]  Date: 04/02/2025  Prepared by: Siena Zabala    Exercises  - Standing Lumbar Extension with Counter  - 3-5 x daily - 7 x weekly - 1 sets - 10 reps  - Standing Lumbar Extension at Wall - Forearms  - 3-5 x daily - 7 x weekly - 1 sets - 10 reps  - Seated Long Arc Quad  - 1 x daily - 7 x weekly - 1 sets - 10 reps  - Standing Hip Abduction with Counter Support  - 1 x daily - 7 x weekly - 1 sets - 10 reps  - Standing Hip Extension with Counter Support  - 1 x daily - 7 x weekly - 1 sets - 10 reps  - Heel Toe Raises with Counter Support  - 1 x daily - 7 x weekly - 1 sets - 10 reps  - Standing Hip Flexor Stretch  - 1 x daily - 7 x weekly - 1 sets - 3 reps - 15 sec hold  - Hooklying Clamshell with Resistance  - 1 x " daily - 7 x weekly - 1 sets - 10 reps  - Supine Active Straight Leg Raise  - 1 x daily - 7 x weekly - 2 sets - 10 reps

## 2025-04-18 ENCOUNTER — OFFICE VISIT (OUTPATIENT)
Dept: PHYSICAL THERAPY | Facility: CLINIC | Age: 75
End: 2025-04-18
Attending: ORTHOPAEDIC SURGERY
Payer: MEDICARE

## 2025-04-18 DIAGNOSIS — M25.562 CHRONIC PAIN OF LEFT KNEE: Primary | ICD-10-CM

## 2025-04-18 DIAGNOSIS — G89.29 CHRONIC PAIN OF LEFT KNEE: Primary | ICD-10-CM

## 2025-04-18 DIAGNOSIS — M17.12 PRIMARY OSTEOARTHRITIS OF LEFT KNEE: ICD-10-CM

## 2025-04-18 DIAGNOSIS — M54.50 RIGHT-SIDED LOW BACK PAIN WITHOUT SCIATICA, UNSPECIFIED CHRONICITY: ICD-10-CM

## 2025-04-18 PROCEDURE — 97110 THERAPEUTIC EXERCISES: CPT

## 2025-04-18 PROCEDURE — 97112 NEUROMUSCULAR REEDUCATION: CPT

## 2025-04-18 NOTE — PROGRESS NOTES
Daily Note     Today's date: 2025  Patient name: Dominga Bender  : 1950  MRN: [id number]  Referring provider: Wali De La Fuente DO  Dx:   Encounter Diagnosis     ICD-10-CM    1. Chronic pain of left knee  M25.562     G89.29       2. Primary osteoarthritis of left knee  M17.12       3. Right-sided low back pain without sciatica, unspecified chronicity  M54.50                        Subjective: Patient reports she is feeling pretty good today and has been doing well with HEP and feels ready to DC. Will return if needed in the future.       Objective: See treatment diary below  See most recent re-eval for most up to date obj measures     Short Term Goals:  to be accomplished in 4 weeks   STG1. Initiate and advance HEP to maximize progress between therapy sessions---met  STG2. Pt will demo L/S AROM < or = mod loss throughout to promote improved functional mobility and body mechanics---met  STG3. Improve B/L LE strength to 4/5 to allow pt to raise from sit to stand w/o UE assist.---met  STG4. Reduce pain w/ WB activity to 0-4/10.---met  STG5. Pt will deny sleep disturbance secondary to pain ---improved but not met     Long Term Goals:  Target Date 12 weeks   LTG1. Pt to be Indep with HEP  to maximize progress between therapy sessions and improve functional mobility  LTG2. Pt will demo L knee ROM of 0-130 needed for reciprocal stairs w/ handrail ---partially met at 125 deg  LTG3.Pt to demo B/L LE strength of 4+/5 or better such that pt can perform reciprocal stair negotiation---not met  LTG4. Pt will return to household ADLs and work duties pain free as per PLOF---partially met (improved tolerance but occasional pain)   LTG5. Pt will demo Good strength core stabilizers to promote carryover with body mech and posture---met  LTG6. Pt will demo L/S AROM < or = min loss throughout to promote improved functional mobility and body mechanics---met          Assessment: Tolerated treatment well with no increase in pain  "t/o session. Dem indp w/ HEP at this time.     Plan:  Patient to DC to HEP at this time.      Precautions:   Past Medical History:   Diagnosis Date    Cancer (HCC)     Hypertension      Past Surgical History:   Procedure Laterality Date    BREAST LUMPECTOMY       SOC: 2/26/2025  FOTO: 2/26/2025  POC Expiration: 5/21/2025  Daily Treatment Log:  Date 4/10/2025 4/15/2025 4/18/2025  4/8/2025   Visit # 11 12 13  10    Auth         Auth exp        Manual                        There Exer 20' 20'   20'   Std hip abd  YTB @ knees 1x10 R/L YTB @ knees 1x10 R/L YTB @ knees 1x10 R/L  YTB @ knees 1x10 R/L   Std hip ext  YTB @ knees 1x10 R/L YTB @ knees 1x10 R/L YTB @ knees 1x10 R/L  YTB @ knees 1x10 R/L   Figure 4 stretch  15\" x3 R/L  15\" x3 R/L  15\" x3 R/L   15\" x3 R/L    Std hip flexor stretch  Std at counter 15\"x3 R/L  Std at counter 15\"x3 R/L  Std at counter 15\"x3 R/L   Std at counter 15\"x3 R/L    Hooklying clamshell  GTB 1x10; 2x GTB 1x10; 2x GTB 2x10   GTB 1x10; 2x   Slant board  15\" x3 B/L  15\" x3 B/L  15\" x3 B/L   15\" x3 B/L    Seated HS curl  YTB 1x10 R/L  YTB 1x10 R/L  YTB 1x10 R/L      Pt edu         Objective measures         HEP   Edu to progress to RTB if YTB becomes easy      There Activ        STS         FSU  6\" step 1x10 R/L  6\" step 1x10 R/L  6\" step 1x10 R/L  6\" step 1x10 R/L    LSU  6\" step 1x10 R/L  6\" step 1x10 R/L  6\" step 1x10 R/L  4\" step 1x10                            NMReed 15' 10'   15'   HR/TR  15x ea  15x ea 1x15 ea  HR/TR 15x ea    SLR flex w/ QS  1x10 R/L ; 2x 1x10  1x10   1x10 R/L; 2x    Bridges  3\" x10  3\" x10  3\" x10   3\" x10    TA brace supine         CRESENCIO         Palloff press  std dlb red tubing 1x5 R/L Std dlb red tubing 2x5 R/L      Qped hip drop        Sciatic N glide  Seated 1x10 R/L  Seated 1x10 R/L Seated 1x10 R/L   Seated 1x10 R/L            Mechanical assessment         Modalities                                HEP:   Access Code: [email or web address]  URL: " [email or web address]  Date: 04/18/2025  Prepared by: Siena Zabala    Program Notes  band strength progression (yellow - red- green)     Exercises  - Standing Lumbar Extension with Counter  - 3-5 x daily - 7 x weekly - 1 sets - 10 reps  - Standing Lumbar Extension at Wall - Forearms  - 3-5 x daily - 7 x weekly - 1 sets - 10 reps  - Seated Long Arc Quad  - 1 x daily - 7 x weekly - 1 sets - 10 reps  - Standing Hip Abduction with Counter Support  - 1 x daily - 7 x weekly - 1 sets - 10 reps  - Standing Hip Extension with Counter Support  - 1 x daily - 7 x weekly - 1 sets - 10 reps  - Heel Toe Raises with Counter Support  - 1 x daily - 7 x weekly - 1 sets - 10 reps  - Standing Hip Flexor Stretch  - 1 x daily - 7 x weekly - 1 sets - 3 reps - 15 sec hold  - Hooklying Clamshell with Resistance  - 1 x daily - 7 x weekly - 1 sets - 10 reps  - Supine Active Straight Leg Raise  - 1 x daily - 7 x weekly - 2 sets - 10 reps